# Patient Record
Sex: FEMALE | Race: OTHER | HISPANIC OR LATINO | Employment: UNEMPLOYED | URBAN - METROPOLITAN AREA
[De-identification: names, ages, dates, MRNs, and addresses within clinical notes are randomized per-mention and may not be internally consistent; named-entity substitution may affect disease eponyms.]

---

## 2023-02-11 ENCOUNTER — HOSPITAL ENCOUNTER (INPATIENT)
Facility: HOSPITAL | Age: 23
LOS: 3 days | Discharge: HOME | DRG: 340 | End: 2023-02-16
Attending: EMERGENCY MEDICINE | Admitting: SURGERY

## 2023-02-11 DIAGNOSIS — K35.80 ACUTE APPENDICITIS: Primary | ICD-10-CM

## 2023-02-11 DIAGNOSIS — R10.9 ABDOMINAL PAIN, UNSPECIFIED ABDOMINAL LOCATION: ICD-10-CM

## 2023-02-11 DIAGNOSIS — R00.0 TACHYCARDIA: ICD-10-CM

## 2023-02-11 DIAGNOSIS — R11.2 NAUSEA AND VOMITING, UNSPECIFIED VOMITING TYPE: ICD-10-CM

## 2023-02-11 DIAGNOSIS — K35.80 ACUTE APPENDICITIS, UNSPECIFIED ACUTE APPENDICITIS TYPE: ICD-10-CM

## 2023-02-11 LAB
ALBUMIN SERPL BCP-MCNC: 4.6 G/DL (ref 3.5–5.2)
ALP SERPL-CCNC: 102 U/L (ref 55–135)
ALT SERPL W/O P-5'-P-CCNC: 10 U/L (ref 10–44)
AMORPH CRY URNS QL MICRO: ABNORMAL
ANION GAP SERPL CALC-SCNC: 12 MMOL/L (ref 8–16)
AST SERPL-CCNC: 15 U/L (ref 10–40)
B-HCG UR QL: NEGATIVE
BACTERIA #/AREA URNS HPF: ABNORMAL /HPF
BASOPHILS # BLD AUTO: 0.04 K/UL (ref 0–0.2)
BASOPHILS NFR BLD: 0.1 % (ref 0–1.9)
BILIRUB SERPL-MCNC: 1.2 MG/DL (ref 0.1–1)
BILIRUB UR QL STRIP: ABNORMAL
BUN SERPL-MCNC: 14 MG/DL (ref 6–20)
CALCIUM SERPL-MCNC: 9.6 MG/DL (ref 8.7–10.5)
CHLORIDE SERPL-SCNC: 103 MMOL/L (ref 95–110)
CLARITY UR: ABNORMAL
CO2 SERPL-SCNC: 23 MMOL/L (ref 23–29)
COLOR UR: ABNORMAL
CREAT SERPL-MCNC: 0.8 MG/DL (ref 0.5–1.4)
CTP QC/QA: YES
CTP QC/QA: YES
DIFFERENTIAL METHOD: ABNORMAL
EOSINOPHIL # BLD AUTO: 0 K/UL (ref 0–0.5)
EOSINOPHIL NFR BLD: 0 % (ref 0–8)
ERYTHROCYTE [DISTWIDTH] IN BLOOD BY AUTOMATED COUNT: 12.9 % (ref 11.5–14.5)
EST. GFR  (NO RACE VARIABLE): >60 ML/MIN/1.73 M^2
GLUCOSE SERPL-MCNC: 128 MG/DL (ref 70–110)
GLUCOSE UR QL STRIP: ABNORMAL
HCT VFR BLD AUTO: 36.8 % (ref 37–48.5)
HGB BLD-MCNC: 12.3 G/DL (ref 12–16)
HGB UR QL STRIP: ABNORMAL
HYALINE CASTS #/AREA URNS LPF: 0 /LPF
IMM GRANULOCYTES # BLD AUTO: 0.14 K/UL (ref 0–0.04)
IMM GRANULOCYTES NFR BLD AUTO: 0.5 % (ref 0–0.5)
KETONES UR QL STRIP: ABNORMAL
LACTATE SERPL-SCNC: 1.1 MMOL/L (ref 0.5–2.2)
LEUKOCYTE ESTERASE UR QL STRIP: ABNORMAL
LIPASE SERPL-CCNC: 22 U/L (ref 4–60)
LYMPHOCYTES # BLD AUTO: 0.7 K/UL (ref 1–4.8)
LYMPHOCYTES NFR BLD: 2.4 % (ref 18–48)
MCH RBC QN AUTO: 28.1 PG (ref 27–31)
MCHC RBC AUTO-ENTMCNC: 33.4 G/DL (ref 32–36)
MCV RBC AUTO: 84 FL (ref 82–98)
MICROSCOPIC COMMENT: ABNORMAL
MONOCYTES # BLD AUTO: 1.6 K/UL (ref 0.3–1)
MONOCYTES NFR BLD: 5.8 % (ref 4–15)
NEUTROPHILS # BLD AUTO: 24.6 K/UL (ref 1.8–7.7)
NEUTROPHILS NFR BLD: 91.2 % (ref 38–73)
NITRITE UR QL STRIP: ABNORMAL
NRBC BLD-RTO: 0 /100 WBC
PH UR STRIP: 6 [PH] (ref 5–8)
PLATELET # BLD AUTO: 371 K/UL (ref 150–450)
PMV BLD AUTO: 9.4 FL (ref 9.2–12.9)
POTASSIUM SERPL-SCNC: 3.9 MMOL/L (ref 3.5–5.1)
PROCALCITONIN SERPL IA-MCNC: 0.15 NG/ML
PROT SERPL-MCNC: 9.3 G/DL (ref 6–8.4)
PROT UR QL STRIP: ABNORMAL
RBC # BLD AUTO: 4.37 M/UL (ref 4–5.4)
RBC #/AREA URNS HPF: 0 /HPF (ref 0–4)
SARS-COV-2 RDRP RESP QL NAA+PROBE: NEGATIVE
SODIUM SERPL-SCNC: 138 MMOL/L (ref 136–145)
SP GR UR STRIP: >1.03 (ref 1–1.03)
UNIDENT CRYS URNS QL MICRO: ABNORMAL
URN SPEC COLLECT METH UR: ABNORMAL
UROBILINOGEN UR STRIP-ACNC: ABNORMAL EU/DL
WBC # BLD AUTO: 26.95 K/UL (ref 3.9–12.7)
WBC #/AREA URNS HPF: 0 /HPF (ref 0–5)
WBC CLUMPS URNS QL MICRO: ABNORMAL
YEAST URNS QL MICRO: ABNORMAL

## 2023-02-11 PROCEDURE — 63600175 PHARM REV CODE 636 W HCPCS: Performed by: NURSE PRACTITIONER

## 2023-02-11 PROCEDURE — 96375 TX/PRO/DX INJ NEW DRUG ADDON: CPT

## 2023-02-11 PROCEDURE — 85025 COMPLETE CBC W/AUTO DIFF WBC: CPT | Performed by: NURSE PRACTITIONER

## 2023-02-11 PROCEDURE — 87040 BLOOD CULTURE FOR BACTERIA: CPT | Mod: 59 | Performed by: NURSE PRACTITIONER

## 2023-02-11 PROCEDURE — 96361 HYDRATE IV INFUSION ADD-ON: CPT

## 2023-02-11 PROCEDURE — 25000003 PHARM REV CODE 250: Performed by: STUDENT IN AN ORGANIZED HEALTH CARE EDUCATION/TRAINING PROGRAM

## 2023-02-11 PROCEDURE — 87186 SC STD MICRODIL/AGAR DIL: CPT | Performed by: NURSE PRACTITIONER

## 2023-02-11 PROCEDURE — G0378 HOSPITAL OBSERVATION PER HR: HCPCS

## 2023-02-11 PROCEDURE — 63600175 PHARM REV CODE 636 W HCPCS: Performed by: STUDENT IN AN ORGANIZED HEALTH CARE EDUCATION/TRAINING PROGRAM

## 2023-02-11 PROCEDURE — 84145 PROCALCITONIN (PCT): CPT | Performed by: NURSE PRACTITIONER

## 2023-02-11 PROCEDURE — 87088 URINE BACTERIA CULTURE: CPT | Performed by: NURSE PRACTITIONER

## 2023-02-11 PROCEDURE — 25500020 PHARM REV CODE 255: Performed by: NURSE PRACTITIONER

## 2023-02-11 PROCEDURE — 99285 EMERGENCY DEPT VISIT HI MDM: CPT | Mod: 25

## 2023-02-11 PROCEDURE — 96372 THER/PROPH/DIAG INJ SC/IM: CPT | Mod: 59 | Performed by: NURSE PRACTITIONER

## 2023-02-11 PROCEDURE — 81025 URINE PREGNANCY TEST: CPT | Performed by: EMERGENCY MEDICINE

## 2023-02-11 PROCEDURE — 83690 ASSAY OF LIPASE: CPT | Performed by: NURSE PRACTITIONER

## 2023-02-11 PROCEDURE — 83605 ASSAY OF LACTIC ACID: CPT | Performed by: NURSE PRACTITIONER

## 2023-02-11 PROCEDURE — 80053 COMPREHEN METABOLIC PANEL: CPT | Performed by: NURSE PRACTITIONER

## 2023-02-11 PROCEDURE — 96365 THER/PROPH/DIAG IV INF INIT: CPT

## 2023-02-11 PROCEDURE — 25000003 PHARM REV CODE 250: Performed by: NURSE PRACTITIONER

## 2023-02-11 PROCEDURE — 87077 CULTURE AEROBIC IDENTIFY: CPT | Performed by: NURSE PRACTITIONER

## 2023-02-11 PROCEDURE — 81000 URINALYSIS NONAUTO W/SCOPE: CPT | Performed by: NURSE PRACTITIONER

## 2023-02-11 PROCEDURE — 87086 URINE CULTURE/COLONY COUNT: CPT | Performed by: NURSE PRACTITIONER

## 2023-02-11 RX ORDER — ONDANSETRON 2 MG/ML
8 INJECTION INTRAMUSCULAR; INTRAVENOUS
Status: COMPLETED | OUTPATIENT
Start: 2023-02-11 | End: 2023-02-11

## 2023-02-11 RX ORDER — KETOROLAC TROMETHAMINE 30 MG/ML
15 INJECTION, SOLUTION INTRAMUSCULAR; INTRAVENOUS
Status: COMPLETED | OUTPATIENT
Start: 2023-02-11 | End: 2023-02-11

## 2023-02-11 RX ORDER — SODIUM CHLORIDE 0.9 % (FLUSH) 0.9 %
10 SYRINGE (ML) INJECTION
Status: DISCONTINUED | OUTPATIENT
Start: 2023-02-11 | End: 2023-02-16 | Stop reason: HOSPADM

## 2023-02-11 RX ORDER — TALC
6 POWDER (GRAM) TOPICAL NIGHTLY PRN
Status: DISCONTINUED | OUTPATIENT
Start: 2023-02-11 | End: 2023-02-16 | Stop reason: HOSPADM

## 2023-02-11 RX ORDER — ACETAMINOPHEN 325 MG/1
650 TABLET ORAL EVERY 8 HOURS PRN
Status: DISCONTINUED | OUTPATIENT
Start: 2023-02-11 | End: 2023-02-16 | Stop reason: HOSPADM

## 2023-02-11 RX ORDER — DICYCLOMINE HYDROCHLORIDE 10 MG/ML
20 INJECTION INTRAMUSCULAR
Status: COMPLETED | OUTPATIENT
Start: 2023-02-11 | End: 2023-02-11

## 2023-02-11 RX ORDER — ONDANSETRON 8 MG/1
8 TABLET, ORALLY DISINTEGRATING ORAL EVERY 8 HOURS PRN
Status: DISCONTINUED | OUTPATIENT
Start: 2023-02-11 | End: 2023-02-16 | Stop reason: HOSPADM

## 2023-02-11 RX ORDER — SODIUM CHLORIDE, SODIUM LACTATE, POTASSIUM CHLORIDE, CALCIUM CHLORIDE 600; 310; 30; 20 MG/100ML; MG/100ML; MG/100ML; MG/100ML
INJECTION, SOLUTION INTRAVENOUS CONTINUOUS
Status: DISCONTINUED | OUTPATIENT
Start: 2023-02-11 | End: 2023-02-14

## 2023-02-11 RX ORDER — MORPHINE SULFATE 4 MG/ML
2 INJECTION, SOLUTION INTRAMUSCULAR; INTRAVENOUS EVERY 4 HOURS PRN
Status: DISCONTINUED | OUTPATIENT
Start: 2023-02-11 | End: 2023-02-13

## 2023-02-11 RX ORDER — LIDOCAINE HYDROCHLORIDE 10 MG/ML
1 INJECTION, SOLUTION EPIDURAL; INFILTRATION; INTRACAUDAL; PERINEURAL ONCE AS NEEDED
Status: DISCONTINUED | OUTPATIENT
Start: 2023-02-11 | End: 2023-02-16 | Stop reason: HOSPADM

## 2023-02-11 RX ORDER — MORPHINE SULFATE 4 MG/ML
4 INJECTION, SOLUTION INTRAMUSCULAR; INTRAVENOUS EVERY 4 HOURS PRN
Status: DISCONTINUED | OUTPATIENT
Start: 2023-02-11 | End: 2023-02-13

## 2023-02-11 RX ADMIN — DICYCLOMINE HYDROCHLORIDE 20 MG: 20 INJECTION INTRAMUSCULAR at 09:02

## 2023-02-11 RX ADMIN — SODIUM CHLORIDE 1000 ML: 9 INJECTION, SOLUTION INTRAVENOUS at 10:02

## 2023-02-11 RX ADMIN — SODIUM CHLORIDE 1000 ML: 9 INJECTION, SOLUTION INTRAVENOUS at 09:02

## 2023-02-11 RX ADMIN — KETOROLAC TROMETHAMINE 15 MG: 30 INJECTION, SOLUTION INTRAMUSCULAR; INTRAVENOUS at 11:02

## 2023-02-11 RX ADMIN — PIPERACILLIN AND TAZOBACTAM 4.5 G: 4; .5 INJECTION, POWDER, LYOPHILIZED, FOR SOLUTION INTRAVENOUS; PARENTERAL at 10:02

## 2023-02-11 RX ADMIN — IOHEXOL 70 ML: 350 INJECTION, SOLUTION INTRAVENOUS at 09:02

## 2023-02-11 RX ADMIN — ONDANSETRON 8 MG: 2 INJECTION INTRAMUSCULAR; INTRAVENOUS at 09:02

## 2023-02-12 ENCOUNTER — ANESTHESIA EVENT (OUTPATIENT)
Dept: SURGERY | Facility: HOSPITAL | Age: 23
DRG: 340 | End: 2023-02-12

## 2023-02-12 ENCOUNTER — ANESTHESIA (OUTPATIENT)
Dept: SURGERY | Facility: HOSPITAL | Age: 23
DRG: 340 | End: 2023-02-12

## 2023-02-12 LAB
ANION GAP SERPL CALC-SCNC: 8 MMOL/L (ref 8–16)
BASOPHILS # BLD AUTO: 0.03 K/UL (ref 0–0.2)
BASOPHILS NFR BLD: 0.2 % (ref 0–1.9)
BUN SERPL-MCNC: 11 MG/DL (ref 6–20)
CALCIUM SERPL-MCNC: 8 MG/DL (ref 8.7–10.5)
CHLORIDE SERPL-SCNC: 109 MMOL/L (ref 95–110)
CO2 SERPL-SCNC: 22 MMOL/L (ref 23–29)
CREAT SERPL-MCNC: 0.7 MG/DL (ref 0.5–1.4)
DIFFERENTIAL METHOD: ABNORMAL
EOSINOPHIL # BLD AUTO: 0 K/UL (ref 0–0.5)
EOSINOPHIL NFR BLD: 0 % (ref 0–8)
ERYTHROCYTE [DISTWIDTH] IN BLOOD BY AUTOMATED COUNT: 13.1 % (ref 11.5–14.5)
EST. GFR  (NO RACE VARIABLE): >60 ML/MIN/1.73 M^2
GLUCOSE SERPL-MCNC: 113 MG/DL (ref 70–110)
HCT VFR BLD AUTO: 31.2 % (ref 37–48.5)
HGB BLD-MCNC: 10.3 G/DL (ref 12–16)
IMM GRANULOCYTES # BLD AUTO: 0.12 K/UL (ref 0–0.04)
IMM GRANULOCYTES NFR BLD AUTO: 0.6 % (ref 0–0.5)
LACTATE SERPL-SCNC: 1 MMOL/L (ref 0.5–2.2)
LYMPHOCYTES # BLD AUTO: 1 K/UL (ref 1–4.8)
LYMPHOCYTES NFR BLD: 5.4 % (ref 18–48)
MAGNESIUM SERPL-MCNC: 1.9 MG/DL (ref 1.6–2.6)
MCH RBC QN AUTO: 28.5 PG (ref 27–31)
MCHC RBC AUTO-ENTMCNC: 33 G/DL (ref 32–36)
MCV RBC AUTO: 86 FL (ref 82–98)
MONOCYTES # BLD AUTO: 1.1 K/UL (ref 0.3–1)
MONOCYTES NFR BLD: 5.8 % (ref 4–15)
NEUTROPHILS # BLD AUTO: 17.1 K/UL (ref 1.8–7.7)
NEUTROPHILS NFR BLD: 88 % (ref 38–73)
NRBC BLD-RTO: 0 /100 WBC
PHOSPHATE SERPL-MCNC: 2.9 MG/DL (ref 2.7–4.5)
PLATELET # BLD AUTO: 295 K/UL (ref 150–450)
PMV BLD AUTO: 9.3 FL (ref 9.2–12.9)
POTASSIUM SERPL-SCNC: 3.7 MMOL/L (ref 3.5–5.1)
RBC # BLD AUTO: 3.62 M/UL (ref 4–5.4)
SODIUM SERPL-SCNC: 139 MMOL/L (ref 136–145)
WBC # BLD AUTO: 19.38 K/UL (ref 3.9–12.7)

## 2023-02-12 PROCEDURE — 84100 ASSAY OF PHOSPHORUS: CPT | Performed by: STUDENT IN AN ORGANIZED HEALTH CARE EDUCATION/TRAINING PROGRAM

## 2023-02-12 PROCEDURE — 25000003 PHARM REV CODE 250: Performed by: STUDENT IN AN ORGANIZED HEALTH CARE EDUCATION/TRAINING PROGRAM

## 2023-02-12 PROCEDURE — 37000008 HC ANESTHESIA 1ST 15 MINUTES: Performed by: SURGERY

## 2023-02-12 PROCEDURE — 96361 HYDRATE IV INFUSION ADD-ON: CPT

## 2023-02-12 PROCEDURE — D9220A PRA ANESTHESIA: ICD-10-PCS | Mod: ,,, | Performed by: ANESTHESIOLOGY

## 2023-02-12 PROCEDURE — 83735 ASSAY OF MAGNESIUM: CPT | Performed by: STUDENT IN AN ORGANIZED HEALTH CARE EDUCATION/TRAINING PROGRAM

## 2023-02-12 PROCEDURE — 25000003 PHARM REV CODE 250: Performed by: NURSE ANESTHETIST, CERTIFIED REGISTERED

## 2023-02-12 PROCEDURE — 99222 PR INITIAL HOSPITAL CARE,LEVL II: ICD-10-PCS | Mod: ,,, | Performed by: SURGERY

## 2023-02-12 PROCEDURE — 27201423 OPTIME MED/SURG SUP & DEVICES STERILE SUPPLY: Performed by: SURGERY

## 2023-02-12 PROCEDURE — 88304 PR  SURG PATH,LEVEL III: ICD-10-PCS | Mod: 26,,, | Performed by: PATHOLOGY

## 2023-02-12 PROCEDURE — 63600175 PHARM REV CODE 636 W HCPCS: Performed by: STUDENT IN AN ORGANIZED HEALTH CARE EDUCATION/TRAINING PROGRAM

## 2023-02-12 PROCEDURE — 88304 TISSUE EXAM BY PATHOLOGIST: CPT | Performed by: PATHOLOGY

## 2023-02-12 PROCEDURE — 44970 PR LAP,APPENDECTOMY: ICD-10-PCS | Mod: ,,, | Performed by: SURGERY

## 2023-02-12 PROCEDURE — 94760 N-INVAS EAR/PLS OXIMETRY 1: CPT

## 2023-02-12 PROCEDURE — 83605 ASSAY OF LACTIC ACID: CPT | Performed by: STUDENT IN AN ORGANIZED HEALTH CARE EDUCATION/TRAINING PROGRAM

## 2023-02-12 PROCEDURE — G0378 HOSPITAL OBSERVATION PER HR: HCPCS

## 2023-02-12 PROCEDURE — 96366 THER/PROPH/DIAG IV INF ADDON: CPT

## 2023-02-12 PROCEDURE — 85025 COMPLETE CBC W/AUTO DIFF WBC: CPT | Performed by: STUDENT IN AN ORGANIZED HEALTH CARE EDUCATION/TRAINING PROGRAM

## 2023-02-12 PROCEDURE — 96375 TX/PRO/DX INJ NEW DRUG ADDON: CPT

## 2023-02-12 PROCEDURE — 36000709 HC OR TIME LEV III EA ADD 15 MIN: Performed by: SURGERY

## 2023-02-12 PROCEDURE — 99222 1ST HOSP IP/OBS MODERATE 55: CPT | Mod: ,,, | Performed by: SURGERY

## 2023-02-12 PROCEDURE — 63600175 PHARM REV CODE 636 W HCPCS: Performed by: NURSE ANESTHETIST, CERTIFIED REGISTERED

## 2023-02-12 PROCEDURE — 80048 BASIC METABOLIC PNL TOTAL CA: CPT | Performed by: STUDENT IN AN ORGANIZED HEALTH CARE EDUCATION/TRAINING PROGRAM

## 2023-02-12 PROCEDURE — 36415 COLL VENOUS BLD VENIPUNCTURE: CPT | Performed by: STUDENT IN AN ORGANIZED HEALTH CARE EDUCATION/TRAINING PROGRAM

## 2023-02-12 PROCEDURE — 36000708 HC OR TIME LEV III 1ST 15 MIN: Performed by: SURGERY

## 2023-02-12 PROCEDURE — 37000009 HC ANESTHESIA EA ADD 15 MINS: Performed by: SURGERY

## 2023-02-12 PROCEDURE — 96376 TX/PRO/DX INJ SAME DRUG ADON: CPT

## 2023-02-12 PROCEDURE — 71000033 HC RECOVERY, INTIAL HOUR: Performed by: SURGERY

## 2023-02-12 PROCEDURE — 25000003 PHARM REV CODE 250: Performed by: SURGERY

## 2023-02-12 PROCEDURE — 63600175 PHARM REV CODE 636 W HCPCS: Performed by: ANESTHESIOLOGY

## 2023-02-12 PROCEDURE — D9220A PRA ANESTHESIA: Mod: ,,, | Performed by: ANESTHESIOLOGY

## 2023-02-12 PROCEDURE — 88304 TISSUE EXAM BY PATHOLOGIST: CPT | Mod: 26,,, | Performed by: PATHOLOGY

## 2023-02-12 PROCEDURE — 44970 LAPAROSCOPY APPENDECTOMY: CPT | Mod: ,,, | Performed by: SURGERY

## 2023-02-12 RX ORDER — ONDANSETRON 2 MG/ML
INJECTION INTRAMUSCULAR; INTRAVENOUS
Status: DISCONTINUED | OUTPATIENT
Start: 2023-02-12 | End: 2023-02-12

## 2023-02-12 RX ORDER — PROPOFOL 10 MG/ML
VIAL (ML) INTRAVENOUS
Status: DISCONTINUED | OUTPATIENT
Start: 2023-02-12 | End: 2023-02-12

## 2023-02-12 RX ORDER — ROCURONIUM BROMIDE 10 MG/ML
INJECTION, SOLUTION INTRAVENOUS
Status: DISCONTINUED | OUTPATIENT
Start: 2023-02-12 | End: 2023-02-12

## 2023-02-12 RX ORDER — HYDROMORPHONE HYDROCHLORIDE 2 MG/ML
0.2 INJECTION, SOLUTION INTRAMUSCULAR; INTRAVENOUS; SUBCUTANEOUS EVERY 5 MIN PRN
Status: DISCONTINUED | OUTPATIENT
Start: 2023-02-12 | End: 2023-02-12 | Stop reason: HOSPADM

## 2023-02-12 RX ORDER — PHENYLEPHRINE HYDROCHLORIDE 10 MG/ML
INJECTION INTRAVENOUS
Status: DISCONTINUED | OUTPATIENT
Start: 2023-02-12 | End: 2023-02-12

## 2023-02-12 RX ORDER — BUPIVACAINE HYDROCHLORIDE 2.5 MG/ML
INJECTION, SOLUTION INFILTRATION; PERINEURAL
Status: DISCONTINUED | OUTPATIENT
Start: 2023-02-12 | End: 2023-02-12 | Stop reason: HOSPADM

## 2023-02-12 RX ORDER — DEXAMETHASONE SODIUM PHOSPHATE 4 MG/ML
INJECTION, SOLUTION INTRA-ARTICULAR; INTRALESIONAL; INTRAMUSCULAR; INTRAVENOUS; SOFT TISSUE
Status: DISCONTINUED | OUTPATIENT
Start: 2023-02-12 | End: 2023-02-12

## 2023-02-12 RX ORDER — FENTANYL CITRATE 50 UG/ML
INJECTION, SOLUTION INTRAMUSCULAR; INTRAVENOUS
Status: DISCONTINUED | OUTPATIENT
Start: 2023-02-12 | End: 2023-02-12

## 2023-02-12 RX ORDER — ONDANSETRON 2 MG/ML
4 INJECTION INTRAMUSCULAR; INTRAVENOUS DAILY PRN
Status: DISCONTINUED | OUTPATIENT
Start: 2023-02-12 | End: 2023-02-12 | Stop reason: HOSPADM

## 2023-02-12 RX ORDER — SODIUM CHLORIDE 0.9 % (FLUSH) 0.9 %
10 SYRINGE (ML) INJECTION
Status: DISCONTINUED | OUTPATIENT
Start: 2023-02-12 | End: 2023-02-12 | Stop reason: HOSPADM

## 2023-02-12 RX ORDER — PROCHLORPERAZINE EDISYLATE 5 MG/ML
INJECTION INTRAMUSCULAR; INTRAVENOUS
Status: DISCONTINUED | OUTPATIENT
Start: 2023-02-12 | End: 2023-02-12

## 2023-02-12 RX ORDER — LIDOCAINE HYDROCHLORIDE 20 MG/ML
INJECTION INTRAVENOUS
Status: DISCONTINUED | OUTPATIENT
Start: 2023-02-12 | End: 2023-02-12

## 2023-02-12 RX ORDER — MIDAZOLAM HYDROCHLORIDE 1 MG/ML
INJECTION, SOLUTION INTRAMUSCULAR; INTRAVENOUS
Status: DISCONTINUED | OUTPATIENT
Start: 2023-02-12 | End: 2023-02-12

## 2023-02-12 RX ADMIN — ONDANSETRON 4 MG: 2 INJECTION, SOLUTION INTRAMUSCULAR; INTRAVENOUS at 10:02

## 2023-02-12 RX ADMIN — PIPERACILLIN AND TAZOBACTAM 4.5 G: 4; .5 INJECTION, POWDER, LYOPHILIZED, FOR SOLUTION INTRAVENOUS; PARENTERAL at 06:02

## 2023-02-12 RX ADMIN — PIPERACILLIN AND TAZOBACTAM 4.5 G: 4; .5 INJECTION, POWDER, LYOPHILIZED, FOR SOLUTION INTRAVENOUS; PARENTERAL at 04:02

## 2023-02-12 RX ADMIN — DEXAMETHASONE SODIUM PHOSPHATE 4 MG: 4 INJECTION, SOLUTION INTRAMUSCULAR; INTRAVENOUS at 10:02

## 2023-02-12 RX ADMIN — SODIUM CHLORIDE, SODIUM LACTATE, POTASSIUM CHLORIDE, AND CALCIUM CHLORIDE: .6; .31; .03; .02 INJECTION, SOLUTION INTRAVENOUS at 10:02

## 2023-02-12 RX ADMIN — SODIUM CHLORIDE, POTASSIUM CHLORIDE, SODIUM LACTATE AND CALCIUM CHLORIDE 500 ML: 600; 310; 30; 20 INJECTION, SOLUTION INTRAVENOUS at 07:02

## 2023-02-12 RX ADMIN — SUGAMMADEX 200 MG: 100 INJECTION, SOLUTION INTRAVENOUS at 11:02

## 2023-02-12 RX ADMIN — SODIUM CHLORIDE, POTASSIUM CHLORIDE, SODIUM LACTATE AND CALCIUM CHLORIDE: 600; 310; 30; 20 INJECTION, SOLUTION INTRAVENOUS at 08:02

## 2023-02-12 RX ADMIN — SODIUM CHLORIDE, POTASSIUM CHLORIDE, SODIUM LACTATE AND CALCIUM CHLORIDE: 600; 310; 30; 20 INJECTION, SOLUTION INTRAVENOUS at 12:02

## 2023-02-12 RX ADMIN — MORPHINE SULFATE 4 MG: 4 INJECTION, SOLUTION INTRAMUSCULAR; INTRAVENOUS at 05:02

## 2023-02-12 RX ADMIN — FENTANYL CITRATE 100 MCG: 0.05 INJECTION, SOLUTION INTRAMUSCULAR; INTRAVENOUS at 10:02

## 2023-02-12 RX ADMIN — LIDOCAINE HYDROCHLORIDE 60 MG: 20 INJECTION, SOLUTION INTRAVENOUS at 10:02

## 2023-02-12 RX ADMIN — PHENYLEPHRINE HYDROCHLORIDE 100 MCG: 10 INJECTION INTRAVENOUS at 10:02

## 2023-02-12 RX ADMIN — PIPERACILLIN AND TAZOBACTAM 4.5 G: 4; .5 INJECTION, POWDER, LYOPHILIZED, FOR SOLUTION INTRAVENOUS; PARENTERAL at 10:02

## 2023-02-12 RX ADMIN — PROPOFOL 140 MG: 10 INJECTION, EMULSION INTRAVENOUS at 10:02

## 2023-02-12 RX ADMIN — PHENYLEPHRINE HYDROCHLORIDE 200 MCG: 10 INJECTION INTRAVENOUS at 11:02

## 2023-02-12 RX ADMIN — MIDAZOLAM HYDROCHLORIDE 2 MG: 1 INJECTION, SOLUTION INTRAMUSCULAR; INTRAVENOUS at 10:02

## 2023-02-12 RX ADMIN — SODIUM CHLORIDE, POTASSIUM CHLORIDE, SODIUM LACTATE AND CALCIUM CHLORIDE: 600; 310; 30; 20 INJECTION, SOLUTION INTRAVENOUS at 04:02

## 2023-02-12 RX ADMIN — PROCHLORPERAZINE EDISYLATE 5 MG: 5 INJECTION INTRAMUSCULAR; INTRAVENOUS at 10:02

## 2023-02-12 RX ADMIN — SODIUM CHLORIDE, SODIUM LACTATE, POTASSIUM CHLORIDE, AND CALCIUM CHLORIDE: .6; .31; .03; .02 INJECTION, SOLUTION INTRAVENOUS at 11:02

## 2023-02-12 RX ADMIN — HYDROMORPHONE HYDROCHLORIDE 0.2 MG: 2 INJECTION, SOLUTION INTRAMUSCULAR; INTRAVENOUS; SUBCUTANEOUS at 12:02

## 2023-02-12 RX ADMIN — MORPHINE SULFATE 4 MG: 4 INJECTION, SOLUTION INTRAMUSCULAR; INTRAVENOUS at 07:02

## 2023-02-12 RX ADMIN — ROCURONIUM BROMIDE 50 MG: 10 INJECTION, SOLUTION INTRAVENOUS at 10:02

## 2023-02-12 NOTE — ANESTHESIA POSTPROCEDURE EVALUATION
Anesthesia Post Evaluation    Patient: Ese Martinez    Procedure(s) Performed: Procedure(s) (LRB):  APPENDECTOMY, LAPAROSCOPIC (N/A)    Final Anesthesia Type: general      Patient location during evaluation: PACU  Patient participation: Yes- Able to Participate  Level of consciousness: awake and alert  Post-procedure vital signs: reviewed and stable  Pain management: adequate  Airway patency: patent    PONV status at discharge: No PONV  Anesthetic complications: no      Cardiovascular status: hemodynamically stable  Respiratory status: unassisted and spontaneous ventilation  Hydration status: euvolemic  Follow-up not needed.          Vitals Value Taken Time   /62 02/12/23 1231   Temp 37.7 °C (99.9 °F) 02/12/23 1204   Pulse 112 02/12/23 1243   Resp 13 02/12/23 1243   SpO2 94 % 02/12/23 1243   Vitals shown include unvalidated device data.      No case tracking events are documented in the log.      Pain/Lukas Score: Pain Rating Prior to Med Admin: 7 (2/12/2023 12:30 PM)  Pain Rating Post Med Admin: 5 (2/12/2023 12:35 PM)  Lukas Score: 10 (2/12/2023 12:30 PM)

## 2023-02-12 NOTE — PLAN OF CARE
02/12/23 1435      Offer of free  was accepted or rejected? accepted   Interpreted items include  visit    service used Over the phone   's ID # [id number] Laurie

## 2023-02-12 NOTE — SUBJECTIVE & OBJECTIVE
No current facility-administered medications on file prior to encounter.     No current outpatient medications on file prior to encounter.       Review of patient's allergies indicates:  No Known Allergies    No past medical history on file.  No past surgical history on file.  Family History    None       Tobacco Use    Smoking status: Not on file    Smokeless tobacco: Not on file   Substance and Sexual Activity    Alcohol use: Not on file    Drug use: Not on file    Sexual activity: Not on file     Review of Systems   Constitutional:  Positive for activity change and appetite change. Negative for chills, fatigue, fever and unexpected weight change.   HENT:  Negative for congestion, sinus pressure, sinus pain and sore throat.    Eyes:  Negative for visual disturbance.   Respiratory:  Negative for cough, chest tightness and shortness of breath.    Cardiovascular:  Negative for chest pain and palpitations.   Gastrointestinal:  Positive for abdominal pain, nausea and vomiting. Negative for constipation and diarrhea.   Genitourinary:  Negative for difficulty urinating, flank pain and urgency.   Musculoskeletal:  Negative for arthralgias, back pain and neck pain.   Neurological:  Negative for dizziness, weakness, light-headedness and headaches.   Objective:     Vital Signs (Most Recent):  Temp: 99.8 °F (37.7 °C) (02/12/23 0701)  Pulse: (!) 126 (02/12/23 0701)  Resp: 18 (02/12/23 0706)  BP: (!) 108/58 (02/12/23 0701)  SpO2: 97 % (02/12/23 0701)   Vital Signs (24h Range):  Temp:  [97.7 °F (36.5 °C)-99.8 °F (37.7 °C)] 99.8 °F (37.7 °C)  Pulse:  [112-130] 126  Resp:  [17-20] 18  SpO2:  [97 %-100 %] 97 %  BP: (105-129)/(58-78) 108/58     Weight: 61 kg (134 lb 7.7 oz)  Body mass index is 25.41 kg/m².    Physical Exam  Constitutional:       General: She is not in acute distress.  HENT:      Head: Normocephalic and atraumatic.   Eyes:      Extraocular Movements: Extraocular movements intact.      Conjunctiva/sclera: Conjunctivae  normal.      Pupils: Pupils are equal, round, and reactive to light.   Cardiovascular:      Rate and Rhythm: Regular rhythm. Tachycardia present.   Pulmonary:      Effort: Pulmonary effort is normal. No respiratory distress.   Abdominal:      General: There is no distension.      Palpations: Abdomen is soft.      Comments: Lower abdomen tender to palpation  No rebound. Voluntary guarding.    Neurological:      General: No focal deficit present.      Mental Status: She is alert.       Significant Labs:  I have reviewed all pertinent lab results within the past 24 hours.  CBC:   Recent Labs   Lab 02/12/23 0222   WBC 19.38*   RBC 3.62*   HGB 10.3*   HCT 31.2*      MCV 86   MCH 28.5   MCHC 33.0     CMP:   Recent Labs   Lab 02/11/23 2056 02/12/23 0222   * 113*   CALCIUM 9.6 8.0*   ALBUMIN 4.6  --    PROT 9.3*  --     139   K 3.9 3.7   CO2 23 22*    109   BUN 14 11   CREATININE 0.8 0.7   ALKPHOS 102  --    ALT 10  --    AST 15  --    BILITOT 1.2*  --        Significant Diagnostics:  I have reviewed all pertinent imaging results/findings within the past 24 hours.

## 2023-02-12 NOTE — NURSING
Report received and care assumed. Discussed plan of care and safety with patient . Reviewed call system. No acute distress noted  Morphine 4 mg given for pain 10/10

## 2023-02-12 NOTE — H&P
[facility]  General Surgery  History & Physical    Patient Name: Ese Martinez  MRN: [id number]  Admission Date: 2/11/2023  Attending Physician: Flako Ruelas MD   Primary Care Provider: Primary Doctor No    Patient information was obtained from patient and ER records.     Subjective:     Chief Complaint/Reason for Admission: abdominal pain    History of Present Illness: Patient is a 22 y.o. female with no significant past medical history who was admitted for acute appendicitis. Patient states that she began having LUQ pain and emesis at 3 am yesterday morning. She reports being in usual state of health prior to that. States her pain progressed and she was unable to keep anything down so she went to the ED. Pain relocated to her lower abdomen. Tachycardic on admission. WBC on labs 27 and CT with findings consistent with non-perforated acute appendicitis.       No current facility-administered medications on file prior to encounter.     No current outpatient medications on file prior to encounter.       Review of patient's allergies indicates:  No Known Allergies    No past medical history on file.  No past surgical history on file.  Family History    None       Tobacco Use    Smoking status: Not on file    Smokeless tobacco: Not on file   Substance and Sexual Activity    Alcohol use: Not on file    Drug use: Not on file    Sexual activity: Not on file     Review of Systems   Constitutional:  Positive for activity change and appetite change. Negative for chills, fatigue, fever and unexpected weight change.   HENT:  Negative for congestion, sinus pressure, sinus pain and sore throat.    Eyes:  Negative for visual disturbance.   Respiratory:  Negative for cough, chest tightness and shortness of breath.    Cardiovascular:  Negative for chest pain and palpitations.   Gastrointestinal:  Positive for abdominal pain, nausea and vomiting. Negative for constipation and diarrhea.   Genitourinary:   Negative for difficulty urinating, flank pain and urgency.   Musculoskeletal:  Negative for arthralgias, back pain and neck pain.   Neurological:  Negative for dizziness, weakness, light-headedness and headaches.   Objective:     Vital Signs (Most Recent):  Temp: 99.8 °F (37.7 °C) (02/12/23 0701)  Pulse: (!) 126 (02/12/23 0701)  Resp: 18 (02/12/23 0706)  BP: (!) 108/58 (02/12/23 0701)  SpO2: 97 % (02/12/23 0701)   Vital Signs (24h Range):  Temp:  [97.7 °F (36.5 °C)-99.8 °F (37.7 °C)] 99.8 °F (37.7 °C)  Pulse:  [112-130] 126  Resp:  [17-20] 18  SpO2:  [97 %-100 %] 97 %  BP: (105-129)/(58-78) 108/58     Weight: 61 kg (134 lb 7.7 oz)  Body mass index is 25.41 kg/m².    Physical Exam  Constitutional:       General: She is not in acute distress.  HENT:      Head: Normocephalic and atraumatic.   Eyes:      Extraocular Movements: Extraocular movements intact.      Conjunctiva/sclera: Conjunctivae normal.      Pupils: Pupils are equal, round, and reactive to light.   Cardiovascular:      Rate and Rhythm: Regular rhythm. Tachycardia present.   Pulmonary:      Effort: Pulmonary effort is normal. No respiratory distress.   Abdominal:      General: There is no distension.      Palpations: Abdomen is soft.      Comments: Lower abdomen tender to palpation  No rebound. Voluntary guarding.    Neurological:      General: No focal deficit present.      Mental Status: She is alert.       Significant Labs:  I have reviewed all pertinent lab results within the past 24 hours.  CBC:   Recent Labs   Lab 02/12/23 0222   WBC 19.38*   RBC 3.62*   HGB 10.3*   HCT 31.2*      MCV 86   MCH 28.5   MCHC 33.0     CMP:   Recent Labs   Lab 02/11/23 2056 02/12/23 0222   * 113*   CALCIUM 9.6 8.0*   ALBUMIN 4.6  --    PROT 9.3*  --     139   K 3.9 3.7   CO2 23 22*    109   BUN 14 11   CREATININE 0.8 0.7   ALKPHOS 102  --    ALT 10  --    AST 15  --    BILITOT 1.2*  --        Significant Diagnostics:  I have reviewed all  pertinent imaging results/findings within the past 24 hours.      Assessment/Plan:     * Acute appendicitis  22 y.o. female with no significant past medical history who was admitted for acute appendicitis.     - Plan for OR today for lap appy. Written consent obtained using   - NPO for procedure  - IV antibiotics  - Prn pain medication and anti-emetics      VTE Risk Mitigation (From admission, onward)         Ordered     Place sequential compression device  Until discontinued         02/11/23 2248     IP VTE LOW RISK PATIENT  Once         02/11/23 2248                Talia Dumont MD  General Surgery  [facility]

## 2023-02-12 NOTE — NURSING
Pt arrived on unit via wheelchair, awake alert and oriented. Tajik speaking,  used,Sissy #[id number]. 5/10 abdominal pain. No n/v. IV site noted, IVF started. Pt on room air; no distress. Vitals assessed. Surgical bath. Pt is npo. Safety measures maintained. Will cont to monitor

## 2023-02-12 NOTE — ED PROVIDER NOTES
Encounter Date: 2/11/2023    SCRIBE #1 NOTE: I, Monse Jensen, am scribing for, and in the presence of,  Kye Kline NP. I have scribed the following portions of the note - Other sections scribed: HPI, ROS.     History     Chief Complaint   Patient presents with    Nausea    Vomiting     Pt c/o abdominal pain with nv starting at 3:00 am this morning      Ese Martinez is a 22 y.o. female, with no pertinent past medical history, who presents to the ED with emesis that began 3 am today. Patient states she has not been able to keep anything down today. Patient has associated symptoms of lower abdominal pain, headache, chills, dysuria, and nausea. Patient endorsed an unknown medication PTA with no relief. No other exacerbating or alleviating factors. Patient denies diarrhea, fever, congestion, cough, hematuria, or other associated symptoms. No known allergies.       The history is provided by the patient. A  was used ([id number]).   Review of patient's allergies indicates:  No Known Allergies  No past medical history on file.  No past surgical history on file.  No family history on file.     Review of Systems   Constitutional:  Positive for chills. Negative for fever.   HENT:  Negative for congestion, rhinorrhea and sore throat.    Eyes:  Negative for visual disturbance.   Respiratory:  Negative for cough and shortness of breath.    Cardiovascular:  Negative for chest pain.   Gastrointestinal:  Positive for abdominal pain and vomiting. Negative for diarrhea and nausea.   Genitourinary:  Positive for dysuria. Negative for frequency and hematuria.   Skin:  Negative for color change.   Allergic/Immunologic: Negative for food allergies.   Neurological:  Positive for headaches. Negative for syncope.   Hematological:  Does not bruise/bleed easily.     Physical Exam     Initial Vitals [02/11/23 1854]   BP Pulse Resp Temp SpO2   129/78 (!) 130 18 97.7 °F (36.5 °C) 100 %      MAP       --          Physical Exam    Nursing note and vitals reviewed.  Constitutional: She appears well-developed and well-nourished. She is not diaphoretic. No distress.   HENT:   Head: Normocephalic and atraumatic.   Right Ear: External ear normal.   Left Ear: External ear normal.   Nose: Nose normal.   Eyes: Conjunctivae and EOM are normal. Right eye exhibits no discharge. Left eye exhibits no discharge.   Neck: Neck supple. No tracheal deviation present.   Normal range of motion.  Cardiovascular:  Normal rate.           Pulmonary/Chest: No stridor. No respiratory distress.   Abdominal: Abdomen is soft. She exhibits no distension. There is generalized abdominal tenderness.   Generalized abdominal tenderness.  No focal tenderness.  No rigidity or guarding.  Abdomen is soft.   Musculoskeletal:         General: No tenderness. Normal range of motion.      Cervical back: Normal range of motion and neck supple.     Neurological: She is alert and oriented to person, place, and time. She has normal strength. No cranial nerve deficit or sensory deficit.   Skin: Skin is warm and dry.   Psychiatric: She has a normal mood and affect. Her behavior is normal. Judgment and thought content normal.       ED Course   Procedures  Labs Reviewed   URINALYSIS, REFLEX TO URINE CULTURE - Abnormal; Notable for the following components:       Result Value    Color, UA Orange (*)     Appearance, UA Cloudy (*)     Specific Gravity, UA >1.030 (*)     All other components within normal limits    Narrative:     Specimen Source->Urine   CBC W/ AUTO DIFFERENTIAL - Abnormal; Notable for the following components:    WBC 26.95 (*)     Hematocrit 36.8 (*)     Gran # (ANC) 24.6 (*)     Immature Grans (Abs) 0.14 (*)     Lymph # 0.7 (*)     Mono # 1.6 (*)     Gran % 91.2 (*)     Lymph % 2.4 (*)     All other components within normal limits   COMPREHENSIVE METABOLIC PANEL - Abnormal; Notable for the following components:    Glucose 128 (*)     Total Protein 9.3 (*)      Total Bilirubin 1.2 (*)     All other components within normal limits   URINALYSIS MICROSCOPIC - Abnormal; Notable for the following components:    Amorphous, UA Many (*)     All other components within normal limits    Narrative:     Specimen Source->Urine   CULTURE, BLOOD   CULTURE, BLOOD   CULTURE, URINE   LIPASE   LACTIC ACID, PLASMA   PROCALCITONIN   POCT URINE PREGNANCY   SARS-COV-2 RDRP GENE    Narrative:     This test utilizes isothermal nucleic acid amplification technology to detect the SARS-CoV-2 RdRp nucleic acid segment. The analytical sensitivity (limit of detection) is 500 copies/swab.     A POSITIVE result is indicative of the presence of SARS-CoV-2 RNA; clinical correlation with patient history and other diagnostic information is necessary to determine patient infection status.    A NEGATIVE result means that SARS-CoV-2 nucleic acids are not present above the limit of detection. A NEGATIVE result should be treated as presumptive. It does not rule out the possibility of COVID-19 and should not be the sole basis for treatment decisions. If COVID-19 is strongly suspected based on clinical and exposure history, re-testing using an alternate molecular assay should be considered.     This test is only for use under the Food and Drug Administration s Emergency Use Authorization (EUA).     Commercial kits are provided by [employer]. Performance characteristics of the EUA have been independently verified by [facility] Department of Pathology and Laboratory Medicine.   _________________________________________________________________   The authorized Fact Sheet for Healthcare Providers and the authorized Fact Sheet for Patients of the ID NOW COVID-19 are available on the FDA website:    https://www.fda.gov/media/468031/download      https://www.fda.gov/media/686828/download             Imaging Results               CT Abdomen Pelvis With Contrast (Final result)  Result time 02/11/23  22:07:03      Final result by Cheko Paula MD (02/11/23 22:07:03)                   Impression:      1. Acute appendicitis.  Recommend surgical consultation and follow-up.  2. 2.1 cm minimally complex probable involuting right ovarian cyst.  3. Hepatomegaly.  4. Small fat containing umbilical hernia.  5. Nondistention of the sigmoid colon and rectum.  Minimal wall thickening is not excluded  6.  This report was flagged in Epic as abnormal.      Electronically signed by: Cheko Paula  Date:    02/11/2023  Time:    22:07               Narrative:    EXAMINATION:  CT ABDOMEN PELVIS WITH CONTRAST    CLINICAL HISTORY:  Nausea/vomiting;Abdominal pain, acute, nonlocalized;    TECHNIQUE:  Low dose axial images, sagittal and coronal reformations were obtained from the lung bases to the pubic symphysis following the IV administration of 70 mL of Omnipaque 350 .  Oral contrast was not administered.    COMPARISON:  None.    FINDINGS:  Abdomen:    - Lower thorax:    - Lung bases: No infiltrates and no nodules.    - Liver: Liver is enlarged.  No focal abnormality.    - Gallbladder: No calcified gallstones.    - Bile Ducts: No evidence of intra or extra hepatic biliary ductal dilation.    - Spleen: Negative.    - Kidneys: No mass or hydronephrosis.    - Adrenals: Unremarkable.    - Pancreas: No mass or peripancreatic fat stranding.    - Retroperitoneum:  No significant adenopathy.    - Vascular: No abdominal aortic aneurysm.    - Abdominal wall:  Small fat containing umbilical hernia.    Pelvis:    Urinary bladder is within normal limits.    The uterus is midline.  Ovaries are normal in size.  2.1 cm minimally complex probable involuting right ovarian cyst on axial 136.  No significant free fluid or adenopathy.    Bowel/Mesentery:    No evidence of bowel obstruction.  Nondistention of the sigmoid colon and rectum.  Minimal wall thickening is not excluded.    The appendix is dilated to approximately 12 mm in the right pelvis.   There is mild wall thickening and mild inflammatory stranding.  Findings are consistent with acute appendicitis.  Recommend surgical consultation and follow-up.    No evidence of perforation or abscess formation.    Bones:  No acute osseous abnormality and no suspicious lytic or blastic lesion.                                       Medications   piperacillin-tazobactam (ZOSYN) 4.5 g in dextrose 5 % in water (D5W) 5 % 100 mL IVPB (MB+) (4.5 g Intravenous New Bag 2/11/23 2258)   sodium chloride 0.9% bolus 1,000 mL 1,000 mL (1,000 mLs Intravenous New Bag 2/11/23 2257)   LIDOcaine (PF) 10 mg/ml (1%) injection 10 mg (has no administration in time range)   sodium chloride 0.9% flush 10 mL (has no administration in time range)   ondansetron disintegrating tablet 8 mg (has no administration in time range)   melatonin tablet 6 mg (has no administration in time range)   acetaminophen tablet 650 mg (has no administration in time range)   lactated ringers infusion (has no administration in time range)   piperacillin-tazobactam (ZOSYN) 4.5 g in dextrose 5 % in water (D5W) 5 % 100 mL IVPB (MB+) (has no administration in time range)   morphine injection 2 mg (has no administration in time range)   morphine injection 4 mg (has no administration in time range)   ketorolac injection 15 mg (has no administration in time range)   sodium chloride 0.9% bolus 1,000 mL 1,000 mL (0 mLs Intravenous Stopped 2/11/23 2207)   ondansetron injection 8 mg (8 mg Intravenous Given 2/11/23 2101)   dicyclomine injection 20 mg (20 mg Intramuscular Given 2/11/23 2101)   iohexoL (OMNIPAQUE 350) injection 70 mL (70 mLs Intravenous Given 2/11/23 2149)     Medical Decision Making:   History:   Old Medical Records: I decided to obtain old medical records.  Clinical Tests:   Lab Tests: Ordered and Reviewed  Radiological Study: Ordered and Reviewed  ED Management:  Patient with nausea, vomiting, abdominal pain since early this morning.  CBC with leukocytosis  and elevated ANC concerning for possible infection.  Ordered lactate, blood cultures, urine culture, procalcitonin.  Patient has been somewhat tachycardic.  Otherwise vitals are stable.  Patient afebrile.  Ordered IV fluids 30 milliliters/kilogram plus additional bolus for a total of 2000 mL.  CMP without concerning abnormalities.  Urinalysis without evidence of infection, however the color of the urine has affected test results.  Initial lactic acid within normal limits.  Procalcitonin still pending.  CT with evidence of acute appendicitis.  No evidence of perforation or other associated abnormality.  Ordered Zosyn.  Pain and nausea controlled with Toradol and Zofran.  Case discussed with Dr. Talia Dumont, general surgery resident.  Patient will be placed in observation for further management by General surgery.        Scribe Attestation:   Scribe #1: I performed the above scribed service and the documentation accurately describes the services I performed. I attest to the accuracy of the note.                   Clinical Impression:   Final diagnoses:  [K35.80] Acute appendicitis (Primary)  [R11.2] Nausea and vomiting, unspecified vomiting type  [R10.9] Abdominal pain, unspecified abdominal location  [R00.0] Tachycardia        ED Disposition Condition    Observation            I, Kye Kline NP, personally performed the services described in this documentation. All medical record entries made by the scribe were at my direction and in my presence. I have reviewed the chart and agree that the record reflects my personal performance and is accurate and complete.       Kye Kline NP  02/11/23 6131

## 2023-02-12 NOTE — OP NOTE
[facility] - Surgery  Operative Note    SUMMARY     Surgery Date: 2/12/2023     Surgeon(s) and Role:     * Flako Ruelas MD - Primary    Assisting Surgeon: Talia Dumont    Pre-op Diagnosis:  Acute appendicitis, unspecified acute appendicitis type [K35.80]    Post-op Diagnosis:  Post-Op Diagnosis Codes:     * Acute appendicitis, unspecified acute appendicitis type [K35.80]    Procedure(s) (LRB):  APPENDECTOMY, LAPAROSCOPIC (N/A)    Anesthesia: General    Indication for procedure: Ese Martinez is 22 y.o. female with acute appendicitis. After discussion of disease process, we discussed options and have elected for operation to perform lap appy.    Description of Procedure: After consent was obtained, patient was taken to the OR. The abdomen was prepped and draped in a standard sterile fashion after general anesthesia was started. Time out was performed. Antibiotics were started with zosyn. We began the procedure by incising supraumbilical location and sharply entering the peritoneal cavity. We found a necrotic appendix. There was some turbid fluid, suctioned out. We used the harmonic to take down the mesoappendix. We were being very gentle with the appendix due to a friable appearance. The base was avulsed and we used 2 pds endoloops to securely clamp and close the base. This was closed thoroughly. We were hemostatic. We removed the appendix with an endocatch bag. There was spillage of pus and contents of the appendix. This was washed out thoroughly. The base of the cecum looked good, no inflammation. We removed the trocars and pneumoperitoneum. We closed the fascia with interrupted 0 ethibond sutures and closed the skin with subcuticular 4-0 monocryl sutures, covered with dermabond.  All counts were correct x2. Patient was awakened from anesthesia and taken to the recovery room in a stable condition having suffered no issues at this time.    Description of the findings of the procedure:   Necrotic  gangrenous appendix. Excised completely. The base was secured with endoloop.    Estimated Blood Loss: 5 mL         Specimens:   Specimen (24h ago, onward)       Start     Ordered    02/12/23 1141  Specimen to Pathology, Surgery General Surgery  Once        Comments: Pre-op Diagnosis: Acute appendicitis, unspecified acute appendicitis type [K35.80]Procedure(s):APPENDECTOMY, LAPAROSCOPIC Number of specimens: 1Name of specimens: appendix     References:    Click here for ordering Quick Tip   Question Answer Comment   Procedure Type: General Surgery    Which provider would you like to cc? CLEO MTZ    Release to patient Immediate        02/12/23 1140                    Drains/Implants: None

## 2023-02-12 NOTE — TRANSFER OF CARE
"Anesthesia Transfer of Care Note    Patient: Ese Martinez    Procedure(s) Performed: Procedure(s) (LRB):  APPENDECTOMY, LAPAROSCOPIC (N/A)    Patient location: PACU    Anesthesia Type: general    Transport from OR: Transported from OR on room air with adequate spontaneous ventilation    Post pain: adequate analgesia    Post assessment: no apparent anesthetic complications and tolerated procedure well    Post vital signs: stable    Level of consciousness: sedated and responds to stimulation    Nausea/Vomiting: no nausea/vomiting    Complications: none    Transfer of care protocol was followed      Last vitals:   Visit Vitals  BP (!) 98/53 (BP Location: Right arm)   Pulse (!) 115   Temp 37.7 °C (99.9 °F) (Temporal)   Resp 18   Ht 5' 1" (1.549 m)   Wt 61 kg (134 lb 7.7 oz)   SpO2 98%   Breastfeeding No   BMI 25.41 kg/m²     "

## 2023-02-12 NOTE — ASSESSMENT & PLAN NOTE
22 y.o. female with no significant past medical history who was admitted for acute appendicitis.     - Plan for OR today for lap appy. Written consent obtained using   - NPO for procedure  - IV antibiotics  - Prn pain medication and anti-emetics

## 2023-02-12 NOTE — ED TRIAGE NOTES
Pt to ED with c/o abdominal pain, N/V, and diarrhea since 0300 yesterday morning. Pt reports LLQ pain radiating to RLQ. No meds taken PTA. Pt has slight HASSAN and dysuria.

## 2023-02-12 NOTE — ANESTHESIA PROCEDURE NOTES
Intubation    Date/Time: 2/12/2023 10:56 AM  Performed by: Kingston Garcia CRNA  Authorized by: Dayday Nuno MD     Intubation:     Induction:  Intravenous    Intubated:  Postinduction    Mask Ventilation:  Easy mask    Attempts:  1    Attempted By:  CRNA    Method of Intubation:  Video laryngoscopy    Blade:  Laughlin 3    Laryngeal View Grade: Grade I - full view of cords      Difficult Airway Encountered?: No      Complications:  None    Airway Device:  Oral endotracheal tube    Airway Device Size:  7.0    Style/Cuff Inflation:  Cuffed (inflated to minimal occlusive pressure)    Inflation Amount (mL):  5    Tube secured:  21    Secured at:  The lips    Placement Verified By:  Capnometry    Complicating Factors:  None    Findings Post-Intubation:  BS equal bilateral and atraumatic/condition of teeth unchanged

## 2023-02-12 NOTE — ANESTHESIA PREPROCEDURE EVALUATION
02/12/2023  Ese Martinez is a 22 y.o., female.      Pre-op Assessment    I have reviewed the Patient Summary Reports.     I have reviewed the Nursing Notes.       Review of Systems  Anesthesia Hx:  No problems with previous Anesthesia  Denies Family Hx of Anesthesia complications.   Denies Personal Hx of Anesthesia complications.   Cardiovascular:   Exercise tolerance: good Denies Hypertension.  Denies Dysrhythmias.   Denies CHF.    Pulmonary:  Pulmonary Normal    Renal/:  Renal/ Normal     Hepatic/GI:   Acute appendicitis   Neurological:  Neurology Normal    Endocrine:  Endocrine Normal        Physical Exam  General: Well nourished, Cooperative, Alert and Oriented    Airway:  Mallampati: II   Mouth Opening: Small, but > 3cm  TM Distance: 4 - 6 cm  Tongue: Normal  Neck ROM: Normal ROM    Dental:  Intact    Chest/Lungs:  Clear to auscultation, Normal Respiratory Rate    Heart:  Rate: Tachycardia  Rhythm: Regular Rhythm      Wt Readings from Last 3 Encounters:   02/12/23 61 kg (134 lb 7.7 oz)     Temp Readings from Last 3 Encounters:   02/12/23 37.7 °C (99.8 °F) (Oral)     BP Readings from Last 3 Encounters:   02/12/23 (!) 108/58     Pulse Readings from Last 3 Encounters:   02/12/23 (!) 126     Lab Results   Component Value Date    WBC 19.38 (H) 02/12/2023    HGB 10.3 (L) 02/12/2023    HCT 31.2 (L) 02/12/2023    MCV 86 02/12/2023     02/12/2023       CMP  Sodium   Date Value Ref Range Status   02/12/2023 139 136 - 145 mmol/L Final     Potassium   Date Value Ref Range Status   02/12/2023 3.7 3.5 - 5.1 mmol/L Final     Chloride   Date Value Ref Range Status   02/12/2023 109 95 - 110 mmol/L Final     CO2   Date Value Ref Range Status   02/12/2023 22 (L) 23 - 29 mmol/L Final     Glucose   Date Value Ref Range Status   02/12/2023 113 (H) 70 - 110 mg/dL Final     BUN   Date Value Ref Range  Status   02/12/2023 11 6 - 20 mg/dL Final     Creatinine   Date Value Ref Range Status   02/12/2023 0.7 0.5 - 1.4 mg/dL Final     Calcium   Date Value Ref Range Status   02/12/2023 8.0 (L) 8.7 - 10.5 mg/dL Final     Total Protein   Date Value Ref Range Status   02/11/2023 9.3 (H) 6.0 - 8.4 g/dL Final     Albumin   Date Value Ref Range Status   02/11/2023 4.6 3.5 - 5.2 g/dL Final     Total Bilirubin   Date Value Ref Range Status   02/11/2023 1.2 (H) 0.1 - 1.0 mg/dL Final     Comment:     For infants and newborns, interpretation of results should be based  on gestational age, weight and in agreement with clinical  observations.    Premature Infant recommended reference ranges:  Up to 24 hours.............<8.0 mg/dL  Up to 48 hours............<12.0 mg/dL  3-5 days..................<15.0 mg/dL  6-29 days.................<15.0 mg/dL       Alkaline Phosphatase   Date Value Ref Range Status   02/11/2023 102 55 - 135 U/L Final     AST   Date Value Ref Range Status   02/11/2023 15 10 - 40 U/L Final     ALT   Date Value Ref Range Status   02/11/2023 10 10 - 44 U/L Final     Anion Gap   Date Value Ref Range Status   02/12/2023 8 8 - 16 mmol/L Final     eGFR   Date Value Ref Range Status   02/12/2023 >60 >60 mL/min/1.73 m^2 Final     2/11/2023 UPT negative  2/11/2023 COVID negative    Anesthesia Plan  Type of Anesthesia, risks & benefits discussed:    Anesthesia Type: Gen ETT  Intra-op Monitoring Plan: Standard ASA Monitors  Post Op Pain Control Plan: multimodal analgesia and IV/PO Opioids PRN  Induction:  IV  Informed Consent: Informed consent signed with the Patient and all parties understand the risks and agree with anesthesia plan.  All questions answered. Patient consented to blood products? Yes  ASA Score: 1  Day of Surgery Review of History & Physical: H&P Update referred to the surgeon/provider.  Anesthesia Plan Notes: H&P and informed consent obtained with help from French interpretor Diana, ID# [id number].    Ready For  Surgery From Anesthesia Perspective.     .

## 2023-02-12 NOTE — HPI
Patient is a 22 y.o. female with no significant past medical history who was admitted for acute appendicitis. Patient states that she began having LUQ pain and emesis at 3 am yesterday morning. She reports being in usual state of health prior to that. States her pain progressed and she was unable to keep anything down so she went to the ED. Pain relocated to her lower abdomen. Tachycardic on admission. WBC on labs 27 and CT with findings consistent with non-perforated acute appendicitis.

## 2023-02-12 NOTE — PLAN OF CARE
[place] [facility] S[facility]  Discharge Assessment    Primary Care Provider: Primary Doctor No     Discharge Assessment (most recent)       BRIEF DISCHARGE ASSESSMENT - 02/12/23 1432          Discharge Planning    Assessment Type Discharge Planning Brief Assessment     Resource/Environmental Concerns none     Support Systems Parent     Equipment Currently Used at Home none     Current Living Arrangements apartment     Patient/Family Anticipates Transition to home with family     Patient/Family Anticipated Services at Transition none     DME Needed Upon Discharge  none     Discharge Plan A Home with family     Discharge Plan B --   n/a

## 2023-02-12 NOTE — NURSING
Report received and patient arrived on unit via bed. Sites to abdomen with durabond intact.  used to communicate. NPO status maintain . Encouragement with ambulation continues. Pain is 2/10 PRS to abdomen. States it is soreness from surgery

## 2023-02-13 LAB
ANION GAP SERPL CALC-SCNC: 8 MMOL/L (ref 8–16)
BACTERIA UR CULT: ABNORMAL
BASOPHILS # BLD AUTO: 0.03 K/UL (ref 0–0.2)
BASOPHILS NFR BLD: 0.2 % (ref 0–1.9)
BUN SERPL-MCNC: 7 MG/DL (ref 6–20)
CALCIUM SERPL-MCNC: 8.5 MG/DL (ref 8.7–10.5)
CHLORIDE SERPL-SCNC: 109 MMOL/L (ref 95–110)
CO2 SERPL-SCNC: 22 MMOL/L (ref 23–29)
CREAT SERPL-MCNC: 0.7 MG/DL (ref 0.5–1.4)
DIFFERENTIAL METHOD: ABNORMAL
EOSINOPHIL # BLD AUTO: 0 K/UL (ref 0–0.5)
EOSINOPHIL NFR BLD: 0.1 % (ref 0–8)
ERYTHROCYTE [DISTWIDTH] IN BLOOD BY AUTOMATED COUNT: 13.2 % (ref 11.5–14.5)
EST. GFR  (NO RACE VARIABLE): >60 ML/MIN/1.73 M^2
GLUCOSE SERPL-MCNC: 99 MG/DL (ref 70–110)
HCT VFR BLD AUTO: 26.7 % (ref 37–48.5)
HGB BLD-MCNC: 8.7 G/DL (ref 12–16)
IMM GRANULOCYTES # BLD AUTO: 0.13 K/UL (ref 0–0.04)
IMM GRANULOCYTES NFR BLD AUTO: 0.8 % (ref 0–0.5)
LYMPHOCYTES # BLD AUTO: 1.3 K/UL (ref 1–4.8)
LYMPHOCYTES NFR BLD: 7.4 % (ref 18–48)
MAGNESIUM SERPL-MCNC: 2 MG/DL (ref 1.6–2.6)
MCH RBC QN AUTO: 28.5 PG (ref 27–31)
MCHC RBC AUTO-ENTMCNC: 32.6 G/DL (ref 32–36)
MCV RBC AUTO: 88 FL (ref 82–98)
MONOCYTES # BLD AUTO: 1.2 K/UL (ref 0.3–1)
MONOCYTES NFR BLD: 7.2 % (ref 4–15)
NEUTROPHILS # BLD AUTO: 14.3 K/UL (ref 1.8–7.7)
NEUTROPHILS NFR BLD: 84.3 % (ref 38–73)
NRBC BLD-RTO: 0 /100 WBC
PHOSPHATE SERPL-MCNC: 3 MG/DL (ref 2.7–4.5)
PLATELET # BLD AUTO: 239 K/UL (ref 150–450)
PMV BLD AUTO: 9.5 FL (ref 9.2–12.9)
POTASSIUM SERPL-SCNC: 3.8 MMOL/L (ref 3.5–5.1)
RBC # BLD AUTO: 3.05 M/UL (ref 4–5.4)
SODIUM SERPL-SCNC: 139 MMOL/L (ref 136–145)
WBC # BLD AUTO: 16.98 K/UL (ref 3.9–12.7)

## 2023-02-13 PROCEDURE — 96366 THER/PROPH/DIAG IV INF ADDON: CPT

## 2023-02-13 PROCEDURE — 85025 COMPLETE CBC W/AUTO DIFF WBC: CPT | Performed by: STUDENT IN AN ORGANIZED HEALTH CARE EDUCATION/TRAINING PROGRAM

## 2023-02-13 PROCEDURE — 83735 ASSAY OF MAGNESIUM: CPT | Performed by: STUDENT IN AN ORGANIZED HEALTH CARE EDUCATION/TRAINING PROGRAM

## 2023-02-13 PROCEDURE — 11000001 HC ACUTE MED/SURG PRIVATE ROOM

## 2023-02-13 PROCEDURE — 84100 ASSAY OF PHOSPHORUS: CPT | Performed by: STUDENT IN AN ORGANIZED HEALTH CARE EDUCATION/TRAINING PROGRAM

## 2023-02-13 PROCEDURE — 36415 COLL VENOUS BLD VENIPUNCTURE: CPT | Performed by: STUDENT IN AN ORGANIZED HEALTH CARE EDUCATION/TRAINING PROGRAM

## 2023-02-13 PROCEDURE — 63600175 PHARM REV CODE 636 W HCPCS: Performed by: STUDENT IN AN ORGANIZED HEALTH CARE EDUCATION/TRAINING PROGRAM

## 2023-02-13 PROCEDURE — 96361 HYDRATE IV INFUSION ADD-ON: CPT

## 2023-02-13 PROCEDURE — 96376 TX/PRO/DX INJ SAME DRUG ADON: CPT

## 2023-02-13 PROCEDURE — 25000003 PHARM REV CODE 250: Performed by: STUDENT IN AN ORGANIZED HEALTH CARE EDUCATION/TRAINING PROGRAM

## 2023-02-13 PROCEDURE — 80048 BASIC METABOLIC PNL TOTAL CA: CPT | Performed by: STUDENT IN AN ORGANIZED HEALTH CARE EDUCATION/TRAINING PROGRAM

## 2023-02-13 RX ORDER — OXYCODONE HYDROCHLORIDE 5 MG/1
5 TABLET ORAL EVERY 4 HOURS PRN
Status: DISCONTINUED | OUTPATIENT
Start: 2023-02-13 | End: 2023-02-16 | Stop reason: HOSPADM

## 2023-02-13 RX ORDER — OXYCODONE HYDROCHLORIDE 5 MG/1
10 TABLET ORAL EVERY 4 HOURS PRN
Status: DISCONTINUED | OUTPATIENT
Start: 2023-02-13 | End: 2023-02-16 | Stop reason: HOSPADM

## 2023-02-13 RX ADMIN — ACETAMINOPHEN 650 MG: 325 TABLET ORAL at 07:02

## 2023-02-13 RX ADMIN — PIPERACILLIN AND TAZOBACTAM 4.5 G: 4; .5 INJECTION, POWDER, LYOPHILIZED, FOR SOLUTION INTRAVENOUS; PARENTERAL at 06:02

## 2023-02-13 RX ADMIN — PIPERACILLIN AND TAZOBACTAM 4.5 G: 4; .5 INJECTION, POWDER, LYOPHILIZED, FOR SOLUTION INTRAVENOUS; PARENTERAL at 11:02

## 2023-02-13 RX ADMIN — PIPERACILLIN AND TAZOBACTAM 4.5 G: 4; .5 INJECTION, POWDER, LYOPHILIZED, FOR SOLUTION INTRAVENOUS; PARENTERAL at 02:02

## 2023-02-13 RX ADMIN — OXYCODONE 10 MG: 5 TABLET ORAL at 02:02

## 2023-02-13 RX ADMIN — SODIUM CHLORIDE, POTASSIUM CHLORIDE, SODIUM LACTATE AND CALCIUM CHLORIDE: 600; 310; 30; 20 INJECTION, SOLUTION INTRAVENOUS at 07:02

## 2023-02-13 RX ADMIN — MORPHINE SULFATE 4 MG: 4 INJECTION, SOLUTION INTRAMUSCULAR; INTRAVENOUS at 02:02

## 2023-02-13 RX ADMIN — SODIUM CHLORIDE, POTASSIUM CHLORIDE, SODIUM LACTATE AND CALCIUM CHLORIDE: 600; 310; 30; 20 INJECTION, SOLUTION INTRAVENOUS at 04:02

## 2023-02-13 RX ADMIN — OXYCODONE 10 MG: 5 TABLET ORAL at 06:02

## 2023-02-13 RX ADMIN — ACETAMINOPHEN 650 MG: 325 TABLET ORAL at 02:02

## 2023-02-13 NOTE — ASSESSMENT & PLAN NOTE
22 y.o. female with no significant past medical history who was admitted for acute appendicitis. S/p lap appy 2/12 - found to have perforated appendicitis intra-op.     - Okay for CLD   - Continue IV antibiotics   - PRN antiemetics and pain medication   - Encourage ambulation   - IS

## 2023-02-13 NOTE — NURSING
Received handoff. Complaints of abdominal pain, pain medications given @ 1704. Cont IVF. Safety measures maintained. Will cont to monitor

## 2023-02-13 NOTE — NURSING
Pt resting in bed watching.  used, Ellie #[id number]. No complaints of pain. Cont IVF. IV antibiotics infusing. x3 lap sites; no redness/drainage. Remains free from fall/injury. Safety measures maintained. Will cont to monitor

## 2023-02-13 NOTE — PLAN OF CARE
Problem: Pain Acute  Goal: Acceptable Pain Control and Functional Ability  Intervention: Develop Pain Management Plan  Flowsheets (Taken 2/12/2023 1908)  Pain Management Interventions:   care clustered   diversional activity provided   pain management plan reviewed with patient/caregiver   relaxation techniques promoted     Problem: Pain Acute  Goal: Acceptable Pain Control and Functional Ability  Intervention: Prevent or Manage Pain  Flowsheets (Taken 2/12/2023 1908)  Sleep/Rest Enhancement:   regular sleep/rest pattern promoted   relaxation techniques promoted  Medication Review/Management:   medications reviewed   high-risk medications identified

## 2023-02-13 NOTE — SUBJECTIVE & OBJECTIVE
Interval History:   No acute events overnight.   POD 1 from lap appy. Appendix found to be perforated.   States improvement in pre-op pain, mainly incisional pain now. Medication helps.   Denies any nausea/vomiting.   Not passing flatus yet.     Medications:  Continuous Infusions:   lactated ringers 125 mL/hr at 02/13/23 0437     Scheduled Meds:   piperacillin-tazobactam (ZOSYN) IVPB  4.5 g Intravenous Q8H     PRN Meds:acetaminophen, LIDOcaine (PF) 10 mg/ml (1%), melatonin, morphine, morphine, ondansetron, sodium chloride 0.9%     Review of patient's allergies indicates:  No Known Allergies  Objective:     Vital Signs (Most Recent):  Temp: 98.3 °F (36.8 °C) (02/13/23 0749)  Pulse: 89 (02/13/23 0749)  Resp: 20 (02/13/23 0749)  BP: (!) 100/58 (02/13/23 0749)  SpO2: 97 % (02/13/23 0749)   Vital Signs (24h Range):  Temp:  [97.4 °F (36.3 °C)-99.9 °F (37.7 °C)] 98.3 °F (36.8 °C)  Pulse:  [] 89  Resp:  [13-20] 20  SpO2:  [95 %-100 %] 97 %  BP: ()/(52-62) 100/58     Weight: 61 kg (134 lb 7.7 oz)  Body mass index is 25.41 kg/m².    Intake/Output - Last 3 Shifts         02/11 0700 02/12 0659 02/12 0700 02/13 0659 02/13 0700 02/14 0659    P.O.  120 0    IV Piggyback  1250     Total Intake(mL/kg)  1370 (22.5) 0 (0)    Urine (mL/kg/hr)  80 (0.1)     Blood  5     Total Output  85     Net  +1285 0           Urine Occurrence 1 x 2 x     Stool Occurrence 0 x              Physical Exam  Constitutional:       General: She is not in acute distress.  HENT:      Head: Normocephalic and atraumatic.   Eyes:      Extraocular Movements: Extraocular movements intact.      Conjunctiva/sclera: Conjunctivae normal.      Pupils: Pupils are equal, round, and reactive to light.   Cardiovascular:      Rate and Rhythm: Normal rate and regular rhythm.   Pulmonary:      Effort: Pulmonary effort is normal. No respiratory distress.   Abdominal:      General: There is no distension.      Palpations: Abdomen is soft.      Comments:  Appropriate tenderness.   Incisions c/d/I.    Neurological:      General: No focal deficit present.      Mental Status: She is alert.       Significant Labs:  I have reviewed all pertinent lab results within the past 24 hours.  CBC:   Recent Labs   Lab 02/13/23  0626   WBC 16.98*   RBC 3.05*   HGB 8.7*   HCT 26.7*      MCV 88   MCH 28.5   MCHC 32.6     CMP:   Recent Labs   Lab 02/11/23 2056 02/12/23 0222 02/13/23  0626   *   < > 99   CALCIUM 9.6   < > 8.5*   ALBUMIN 4.6  --   --    PROT 9.3*  --   --       < > 139   K 3.9   < > 3.8   CO2 23   < > 22*      < > 109   BUN 14   < > 7   CREATININE 0.8   < > 0.7   ALKPHOS 102  --   --    ALT 10  --   --    AST 15  --   --    BILITOT 1.2*  --   --     < > = values in this interval not displayed.       Significant Diagnostics:  I have reviewed all pertinent imaging results/findings within the past 24 hours.

## 2023-02-13 NOTE — PROGRESS NOTES
[facility] Surg  General Surgery  Progress Note    Subjective:     History of Present Illness:  Patient is a 22 y.o. female with no significant past medical history who was admitted for acute appendicitis. Patient states that she began having LUQ pain and emesis at 3 am yesterday morning. She reports being in usual state of health prior to that. States her pain progressed and she was unable to keep anything down so she went to the ED. Pain relocated to her lower abdomen. Tachycardic on admission. WBC on labs 27 and CT with findings consistent with non-perforated acute appendicitis.       Post-Op Info:  Procedure(s) (LRB):  APPENDECTOMY, LAPAROSCOPIC (N/A)   1 Day Post-Op     Interval History:   No acute events overnight.   POD 1 from lap appy. Appendix found to be perforated.   States improvement in pre-op pain, mainly incisional pain now. Medication helps.   Denies any nausea/vomiting.   Not passing flatus yet.     Medications:  Continuous Infusions:   lactated ringers 125 mL/hr at 02/13/23 0437     Scheduled Meds:   piperacillin-tazobactam (ZOSYN) IVPB  4.5 g Intravenous Q8H     PRN Meds:acetaminophen, LIDOcaine (PF) 10 mg/ml (1%), melatonin, morphine, morphine, ondansetron, sodium chloride 0.9%     Review of patient's allergies indicates:  No Known Allergies  Objective:     Vital Signs (Most Recent):  Temp: 98.3 °F (36.8 °C) (02/13/23 0749)  Pulse: 89 (02/13/23 0749)  Resp: 20 (02/13/23 0749)  BP: (!) 100/58 (02/13/23 0749)  SpO2: 97 % (02/13/23 0749)   Vital Signs (24h Range):  Temp:  [97.4 °F (36.3 °C)-99.9 °F (37.7 °C)] 98.3 °F (36.8 °C)  Pulse:  [] 89  Resp:  [13-20] 20  SpO2:  [95 %-100 %] 97 %  BP: ()/(52-62) 100/58     Weight: 61 kg (134 lb 7.7 oz)  Body mass index is 25.41 kg/m².    Intake/Output - Last 3 Shifts         02/11 0700 02/12 0659 02/12 0700 02/13 0659 02/13 0700 02/14 0659    P.O.  120 0    IV Piggyback  1250     Total Intake(mL/kg)  1370 (22.5) 0 (0)    Urine (mL/kg/hr)   80 (0.1)     Blood  5     Total Output  85     Net  +1285 0           Urine Occurrence 1 x 2 x     Stool Occurrence 0 x              Physical Exam  Constitutional:       General: She is not in acute distress.  HENT:      Head: Normocephalic and atraumatic.   Eyes:      Extraocular Movements: Extraocular movements intact.      Conjunctiva/sclera: Conjunctivae normal.      Pupils: Pupils are equal, round, and reactive to light.   Cardiovascular:      Rate and Rhythm: Normal rate and regular rhythm.   Pulmonary:      Effort: Pulmonary effort is normal. No respiratory distress.   Abdominal:      General: There is no distension.      Palpations: Abdomen is soft.      Comments: Appropriate tenderness.   Incisions c/d/I.    Neurological:      General: No focal deficit present.      Mental Status: She is alert.       Significant Labs:  I have reviewed all pertinent lab results within the past 24 hours.  CBC:   Recent Labs   Lab 02/13/23  0626   WBC 16.98*   RBC 3.05*   HGB 8.7*   HCT 26.7*      MCV 88   MCH 28.5   MCHC 32.6     CMP:   Recent Labs   Lab 02/11/23 2056 02/12/23  0222 02/13/23  0626   *   < > 99   CALCIUM 9.6   < > 8.5*   ALBUMIN 4.6  --   --    PROT 9.3*  --   --       < > 139   K 3.9   < > 3.8   CO2 23   < > 22*      < > 109   BUN 14   < > 7   CREATININE 0.8   < > 0.7   ALKPHOS 102  --   --    ALT 10  --   --    AST 15  --   --    BILITOT 1.2*  --   --     < > = values in this interval not displayed.       Significant Diagnostics:  I have reviewed all pertinent imaging results/findings within the past 24 hours.    Assessment/Plan:     * Acute appendicitis  22 y.o. female with no significant past medical history who was admitted for acute appendicitis. S/p lap appy 2/12 - found to have perforated appendicitis intra-op.     - Okay for CLD   - Continue IV antibiotics   - PRN antiemetics and pain medication   - Encourage ambulation   - IS        Talia Dumont MD  General Surgery  [place]  [facility]

## 2023-02-14 PROCEDURE — 63600175 PHARM REV CODE 636 W HCPCS: Performed by: STUDENT IN AN ORGANIZED HEALTH CARE EDUCATION/TRAINING PROGRAM

## 2023-02-14 PROCEDURE — 25000003 PHARM REV CODE 250: Performed by: STUDENT IN AN ORGANIZED HEALTH CARE EDUCATION/TRAINING PROGRAM

## 2023-02-14 PROCEDURE — 11000001 HC ACUTE MED/SURG PRIVATE ROOM

## 2023-02-14 RX ORDER — AMOXICILLIN AND CLAVULANATE POTASSIUM 875; 125 MG/1; MG/1
1 TABLET, FILM COATED ORAL EVERY 12 HOURS
Status: DISCONTINUED | OUTPATIENT
Start: 2023-02-14 | End: 2023-02-15

## 2023-02-14 RX ADMIN — AMOXICILLIN AND CLAVULANATE POTASSIUM 1 TABLET: 875; 125 TABLET, FILM COATED ORAL at 10:02

## 2023-02-14 RX ADMIN — OXYCODONE 10 MG: 5 TABLET ORAL at 09:02

## 2023-02-14 RX ADMIN — AMOXICILLIN AND CLAVULANATE POTASSIUM 1 TABLET: 875; 125 TABLET, FILM COATED ORAL at 09:02

## 2023-02-14 RX ADMIN — PIPERACILLIN AND TAZOBACTAM 4.5 G: 4; .5 INJECTION, POWDER, LYOPHILIZED, FOR SOLUTION INTRAVENOUS; PARENTERAL at 06:02

## 2023-02-14 RX ADMIN — OXYCODONE 10 MG: 5 TABLET ORAL at 04:02

## 2023-02-14 RX ADMIN — ACETAMINOPHEN 650 MG: 325 TABLET ORAL at 04:02

## 2023-02-14 RX ADMIN — OXYCODONE 10 MG: 5 TABLET ORAL at 02:02

## 2023-02-14 NOTE — NURSING
Pt resting in bed.  used, Huong #[id number]. No complaints of pain/discomfort. IV antibiotics infusing. Cont IVF. Pt report to having a bm x1. Remains free from fall/injury. Safety measures maintained. Will cont to monitor

## 2023-02-14 NOTE — NURSING
Ambulated in hallway. Steady gait noted. Complains of pain to lower abdomen. Oxycodone 10 mg given

## 2023-02-14 NOTE — CARE UPDATE
RAPID RESPONSE NURSE PROACTIVE ROUNDING NOTE       Time of Visit: 0910    Admit Date: 2023  LOS: 1  Code Status: Full Code   Date of Visit: 2023  : 2000  Age: 22 y.o.  Sex: female  Race: Other  Bed: [facility] A:   MRN: [id number]  Was the patient discharged from an ICU this admission? No   Was the patient discharged from a PACU within last 24 hours? No   Did the patient receive conscious sedation/general anesthesia in last 24 hours? No   Was the patient in the ED within the past 24 hours? No   Was the patient on NIPPV within the past 24 hours? No   Attending Physician: Flako Ruelas MD  Primary Service: General Surgery,Surgery   Time spent at the bedside: 15 -30 min    SITUATION    Notified by Epic patient alert  Reason for alert: HR 130s    Diagnosis: Acute appendicitis   has no past medical history on file.    Last Vitals:  Temp: 100.2 °F (37.9 °C) (730)  Pulse: 132 (730)  Resp: 14 (730)  BP: 104/62 (730)  SpO2: 96 % (730)    24 Hour Vitals Range:  Temp:  [98.1 °F (36.7 °C)-102.6 °F (39.2 °C)]   Pulse:  []   Resp:  [14-20]   BP: (100-119)/(59-65)   SpO2:  [94 %-98 %]     Clinical Issues: Circulatory    ASSESSMENT/INTERVENTIONS    Upon assessment pt resting quietly recheck VS and  /68 denied pain at present time, abdomin surgical site CDI,encouraged pt to use IS and to ambulate as tolerated but pt stated that she feels dizzy when she gets up, instruct pt to call for assistance before getting out of bed,       RECOMMENDATIONS  Assist pt with activities     Discussed plan of care with charge RNAlie     PROVIDER ESCALATION    Physician escalation: No    Orders received and case discussed with NA.    Disposition:Remain in room 408    FOLLOW UP    Call back the Rapid Response NurseNel  at [phone] for additional questions or concerns.

## 2023-02-14 NOTE — PROGRESS NOTES
[facility] Surg  General Surgery  Progress Note    Subjective:     History of Present Illness:  Patient is a 22 y.o. female with no significant past medical history who was admitted for acute appendicitis. Patient states that she began having LUQ pain and emesis at 3 am yesterday morning. She reports being in usual state of health prior to that. States her pain progressed and she was unable to keep anything down so she went to the ED. Pain relocated to her lower abdomen. Tachycardic on admission. WBC on labs 27 and CT with findings consistent with non-perforated acute appendicitis.       Post-Op Info:  Procedure(s) (LRB):  APPENDECTOMY, LAPAROSCOPIC (N/A)   2 Days Post-Op     Interval History:   No acute events overnight.   Improved abdominal pain today. Controlled with medication.   Tolerating clears.   Passing flatus and having BM.   Ambulating.    Medications:  Continuous Infusions:      Scheduled Meds:   amoxicillin-clavulanate 875-125mg  1 tablet Oral Q12H     PRN Meds:acetaminophen, LIDOcaine (PF) 10 mg/ml (1%), melatonin, ondansetron, oxyCODONE, oxyCODONE, sodium chloride 0.9%     Review of patient's allergies indicates:  No Known Allergies  Objective:     Vital Signs (Most Recent):  Temp: 100.2 °F (37.9 °C) (02/14/23 0730)  Pulse: (!) 132 (02/14/23 0730)  Resp: 14 (02/14/23 0730)  BP: 104/62 (02/14/23 0730)  SpO2: 96 % (02/14/23 0730)   Vital Signs (24h Range):  Temp:  [98.1 °F (36.7 °C)-102.6 °F (39.2 °C)] 100.2 °F (37.9 °C)  Pulse:  [] 132  Resp:  [14-20] 14  SpO2:  [94 %-98 %] 96 %  BP: (100-119)/(59-65) 104/62     Weight: 61 kg (134 lb 7.7 oz)  Body mass index is 25.41 kg/m².    Intake/Output - Last 3 Shifts         02/12 0700  02/13 0659 02/13 0700  02/14 0659 02/14 0700  02/15 0659    P.O. 120 840     IV Piggyback 1250      Total Intake(mL/kg) 1370 (22.5) 840 (13.8)     Urine (mL/kg/hr) 80 (0.1)      Blood 5      Total Output 85      Net +1285 +840            Urine Occurrence 2 x 5 x      Stool Occurrence  1 x             Physical Exam  Constitutional:       General: She is not in acute distress.  HENT:      Head: Normocephalic and atraumatic.   Eyes:      Extraocular Movements: Extraocular movements intact.      Conjunctiva/sclera: Conjunctivae normal.      Pupils: Pupils are equal, round, and reactive to light.   Cardiovascular:      Rate and Rhythm: Normal rate and regular rhythm.   Pulmonary:      Effort: Pulmonary effort is normal. No respiratory distress.   Abdominal:      General: There is no distension.      Palpations: Abdomen is soft.      Comments: Appropriate tenderness.   Incisions c/d/I.    Neurological:      General: No focal deficit present.      Mental Status: She is alert.       Significant Labs:  I have reviewed all pertinent lab results within the past 24 hours.  CBC:   Recent Labs   Lab 02/13/23  0626   WBC 16.98*   RBC 3.05*   HGB 8.7*   HCT 26.7*      MCV 88   MCH 28.5   MCHC 32.6       CMP:   Recent Labs   Lab 02/11/23  2056 02/12/23  0222 02/13/23  0626   *   < > 99   CALCIUM 9.6   < > 8.5*   ALBUMIN 4.6  --   --    PROT 9.3*  --   --       < > 139   K 3.9   < > 3.8   CO2 23   < > 22*      < > 109   BUN 14   < > 7   CREATININE 0.8   < > 0.7   ALKPHOS 102  --   --    ALT 10  --   --    AST 15  --   --    BILITOT 1.2*  --   --     < > = values in this interval not displayed.         Significant Diagnostics:  I have reviewed all pertinent imaging results/findings within the past 24 hours.    Assessment/Plan:     * Acute appendicitis  22 y.o. female with no significant past medical history who was admitted for acute appendicitis. S/p lap appy 2/12 - found to have perforated appendicitis intra-op.     - Okay for regular diet  - transition to PO antibiotics  - PRN antiemetics and pain medication   - Encourage ambulation   - IS    Dispo: possible d/c tomorrow if tolerating diet and remains stable on PO antibiotics.         Talia Dumont MD  General  Surgery  [facility] Surg

## 2023-02-14 NOTE — SUBJECTIVE & OBJECTIVE
Interval History:   No acute events overnight.   Improved abdominal pain today. Controlled with medication.   Tolerating clears.   Passing flatus and having BM.   Ambulating.    Medications:  Continuous Infusions:      Scheduled Meds:   amoxicillin-clavulanate 875-125mg  1 tablet Oral Q12H     PRN Meds:acetaminophen, LIDOcaine (PF) 10 mg/ml (1%), melatonin, ondansetron, oxyCODONE, oxyCODONE, sodium chloride 0.9%     Review of patient's allergies indicates:  No Known Allergies  Objective:     Vital Signs (Most Recent):  Temp: 100.2 °F (37.9 °C) (02/14/23 0730)  Pulse: (!) 132 (02/14/23 0730)  Resp: 14 (02/14/23 0730)  BP: 104/62 (02/14/23 0730)  SpO2: 96 % (02/14/23 0730)   Vital Signs (24h Range):  Temp:  [98.1 °F (36.7 °C)-102.6 °F (39.2 °C)] 100.2 °F (37.9 °C)  Pulse:  [] 132  Resp:  [14-20] 14  SpO2:  [94 %-98 %] 96 %  BP: (100-119)/(59-65) 104/62     Weight: 61 kg (134 lb 7.7 oz)  Body mass index is 25.41 kg/m².    Intake/Output - Last 3 Shifts         02/12 0700  02/13 0659 02/13 0700  02/14 0659 02/14 0700  02/15 0659    P.O. 120 840     IV Piggyback 1250      Total Intake(mL/kg) 1370 (22.5) 840 (13.8)     Urine (mL/kg/hr) 80 (0.1)      Blood 5      Total Output 85      Net +1285 +840            Urine Occurrence 2 x 5 x     Stool Occurrence  1 x             Physical Exam  Constitutional:       General: She is not in acute distress.  HENT:      Head: Normocephalic and atraumatic.   Eyes:      Extraocular Movements: Extraocular movements intact.      Conjunctiva/sclera: Conjunctivae normal.      Pupils: Pupils are equal, round, and reactive to light.   Cardiovascular:      Rate and Rhythm: Normal rate and regular rhythm.   Pulmonary:      Effort: Pulmonary effort is normal. No respiratory distress.   Abdominal:      General: There is no distension.      Palpations: Abdomen is soft.      Comments: Appropriate tenderness.   Incisions c/d/I.    Neurological:      General: No focal deficit present.      Mental  Status: She is alert.       Significant Labs:  I have reviewed all pertinent lab results within the past 24 hours.  CBC:   Recent Labs   Lab 02/13/23  0626   WBC 16.98*   RBC 3.05*   HGB 8.7*   HCT 26.7*      MCV 88   MCH 28.5   MCHC 32.6       CMP:   Recent Labs   Lab 02/11/23 2056 02/12/23 0222 02/13/23  0626   *   < > 99   CALCIUM 9.6   < > 8.5*   ALBUMIN 4.6  --   --    PROT 9.3*  --   --       < > 139   K 3.9   < > 3.8   CO2 23   < > 22*      < > 109   BUN 14   < > 7   CREATININE 0.8   < > 0.7   ALKPHOS 102  --   --    ALT 10  --   --    AST 15  --   --    BILITOT 1.2*  --   --     < > = values in this interval not displayed.         Significant Diagnostics:  I have reviewed all pertinent imaging results/findings within the past 24 hours.

## 2023-02-14 NOTE — ASSESSMENT & PLAN NOTE
22 y.o. female with no significant past medical history who was admitted for acute appendicitis. S/p lap appy 2/12 - found to have perforated appendicitis intra-op.     - Okay for regular diet  - transition to PO antibiotics  - PRN antiemetics and pain medication   - Encourage ambulation   - IS    Dispo: possible d/c tomorrow if tolerating diet and remains stable on PO antibiotics.

## 2023-02-15 LAB
BACTERIA BLD CULT: NORMAL
BACTERIA BLD CULT: NORMAL
BASOPHILS # BLD AUTO: 0.02 K/UL (ref 0–0.2)
BASOPHILS NFR BLD: 0.2 % (ref 0–1.9)
DIFFERENTIAL METHOD: ABNORMAL
EOSINOPHIL # BLD AUTO: 0.3 K/UL (ref 0–0.5)
EOSINOPHIL NFR BLD: 2.5 % (ref 0–8)
ERYTHROCYTE [DISTWIDTH] IN BLOOD BY AUTOMATED COUNT: 12.8 % (ref 11.5–14.5)
FINAL PATHOLOGIC DIAGNOSIS: NORMAL
GROSS: NORMAL
HCT VFR BLD AUTO: 29.7 % (ref 37–48.5)
HGB BLD-MCNC: 9.4 G/DL (ref 12–16)
IMM GRANULOCYTES # BLD AUTO: 0.08 K/UL (ref 0–0.04)
IMM GRANULOCYTES NFR BLD AUTO: 0.8 % (ref 0–0.5)
LYMPHOCYTES # BLD AUTO: 1.1 K/UL (ref 1–4.8)
LYMPHOCYTES NFR BLD: 10.1 % (ref 18–48)
Lab: NORMAL
MCH RBC QN AUTO: 27.9 PG (ref 27–31)
MCHC RBC AUTO-ENTMCNC: 31.6 G/DL (ref 32–36)
MCV RBC AUTO: 88 FL (ref 82–98)
MONOCYTES # BLD AUTO: 1 K/UL (ref 0.3–1)
MONOCYTES NFR BLD: 9.7 % (ref 4–15)
NEUTROPHILS # BLD AUTO: 8 K/UL (ref 1.8–7.7)
NEUTROPHILS NFR BLD: 76.7 % (ref 38–73)
NRBC BLD-RTO: 0 /100 WBC
PLATELET # BLD AUTO: 317 K/UL (ref 150–450)
PMV BLD AUTO: 9.9 FL (ref 9.2–12.9)
RBC # BLD AUTO: 3.37 M/UL (ref 4–5.4)
WBC # BLD AUTO: 10.47 K/UL (ref 3.9–12.7)

## 2023-02-15 PROCEDURE — 25000003 PHARM REV CODE 250: Performed by: STUDENT IN AN ORGANIZED HEALTH CARE EDUCATION/TRAINING PROGRAM

## 2023-02-15 PROCEDURE — 85025 COMPLETE CBC W/AUTO DIFF WBC: CPT | Performed by: STUDENT IN AN ORGANIZED HEALTH CARE EDUCATION/TRAINING PROGRAM

## 2023-02-15 PROCEDURE — 11000001 HC ACUTE MED/SURG PRIVATE ROOM

## 2023-02-15 PROCEDURE — 36415 COLL VENOUS BLD VENIPUNCTURE: CPT | Performed by: STUDENT IN AN ORGANIZED HEALTH CARE EDUCATION/TRAINING PROGRAM

## 2023-02-15 RX ORDER — METRONIDAZOLE 500 MG/1
500 TABLET ORAL EVERY 8 HOURS
Status: DISCONTINUED | OUTPATIENT
Start: 2023-02-15 | End: 2023-02-16 | Stop reason: HOSPADM

## 2023-02-15 RX ORDER — ACETAMINOPHEN 325 MG/1
325 TABLET ORAL EVERY 6 HOURS PRN
Status: DISCONTINUED | OUTPATIENT
Start: 2023-02-15 | End: 2023-02-16 | Stop reason: HOSPADM

## 2023-02-15 RX ORDER — CIPROFLOXACIN 500 MG/1
500 TABLET ORAL EVERY 12 HOURS
Status: DISCONTINUED | OUTPATIENT
Start: 2023-02-15 | End: 2023-02-16 | Stop reason: HOSPADM

## 2023-02-15 RX ADMIN — OXYCODONE 10 MG: 5 TABLET ORAL at 08:02

## 2023-02-15 RX ADMIN — OXYCODONE 10 MG: 5 TABLET ORAL at 02:02

## 2023-02-15 RX ADMIN — CIPROFLOXACIN 500 MG: 500 TABLET, FILM COATED ORAL at 02:02

## 2023-02-15 RX ADMIN — ONDANSETRON 8 MG: 8 TABLET, ORALLY DISINTEGRATING ORAL at 09:02

## 2023-02-15 RX ADMIN — METRONIDAZOLE 500 MG: 500 TABLET ORAL at 10:02

## 2023-02-15 RX ADMIN — CIPROFLOXACIN 500 MG: 500 TABLET, FILM COATED ORAL at 08:02

## 2023-02-15 RX ADMIN — METRONIDAZOLE 500 MG: 500 TABLET ORAL at 02:02

## 2023-02-15 RX ADMIN — ACETAMINOPHEN 325 MG: 325 TABLET ORAL at 02:02

## 2023-02-15 RX ADMIN — AMOXICILLIN AND CLAVULANATE POTASSIUM 1 TABLET: 875; 125 TABLET, FILM COATED ORAL at 09:02

## 2023-02-15 NOTE — PLAN OF CARE
Problem: Adult Inpatient Plan of Care  Goal: Plan of Care Review  Outcome: Ongoing, Progressing     Problem: Pain Acute  Goal: Acceptable Pain Control and Functional Ability  Outcome: Ongoing, Progressing

## 2023-02-15 NOTE — NURSING
ID [id number]  Used language line to communicate with patient. Pt reported pain to generalized abdomen, right shoulder, and left lower back. Provided PRN pain medication. Will continue to monitor pt.

## 2023-02-15 NOTE — ASSESSMENT & PLAN NOTE
22 y.o. female with no significant past medical history who was admitted for acute appendicitis. S/p lap appy 2/12 - found to have perforated appendicitis intra-op.     - Continue regular diet  - transition to cipro/flagyl  - PRN antiemetics and pain medication   - Encourage ambulation   - IS

## 2023-02-15 NOTE — PLAN OF CARE
Problem: Adult Inpatient Plan of Care  Goal: Plan of Care Review  Flowsheets (Taken 2/15/2023 1327)  Plan of Care Reviewed With: patient  Goal: Absence of Hospital-Acquired Illness or Injury  Intervention: Identify and Manage Fall Risk  Flowsheets (Taken 2/15/2023 1328)  Safety Promotion/Fall Prevention:   assistive device/personal item within reach   Fall Risk reviewed with patient/family   high risk medications identified   nonskid shoes/socks when out of bed   medications reviewed   room near unit station   instructed to call staff for mobility   side rails raised x 2  Intervention: Prevent Skin Injury  Flowsheets (Taken 2/15/2023 1328)  Body Position:   position changed independently   weight shifting  Skin Protection:   adhesive use limited   tubing/devices free from skin contact  Intervention: Prevent and Manage VTE (Venous Thromboembolism) Risk  Flowsheets (Taken 2/15/2023 1328)  Activity Management:   Arm raise - L1   Ankle pumps - L1   Rolling - L1   Straight leg raise - L1  VTE Prevention/Management:   ambulation promoted   bleeding precations maintained   bleeding risk assessed  Range of Motion: active ROM (range of motion) encouraged  Intervention: Prevent Infection  Flowsheets (Taken 2/15/2023 1328)  Infection Prevention: hand hygiene promoted     Problem: Pain Acute  Goal: Acceptable Pain Control and Functional Ability  Intervention: Develop Pain Management Plan  Flowsheets (Taken 2/15/2023 1328)  Pain Management Interventions: relaxation techniques promoted  Intervention: Prevent or Manage Pain  Flowsheets (Taken 2/15/2023 1328)  Medication Review/Management:   medications reviewed   high-risk medications identified   Pt alert able to make needs known,larry meds well,IV abx remains in progress,no s/s adverse reaction noted,reposition self q 2hrs,pain controlled by prn pain medication,POC explained,remains free from falls and pressure injuries,safety maintained,continue monitoring.

## 2023-02-15 NOTE — PROGRESS NOTES
[facility]  General Surgery  Progress Note    Subjective:     History of Present Illness:  Patient is a 22 y.o. female with no significant past medical history who was admitted for acute appendicitis. Patient states that she began having LUQ pain and emesis at 3 am yesterday morning. She reports being in usual state of health prior to that. States her pain progressed and she was unable to keep anything down so she went to the ED. Pain relocated to her lower abdomen. Tachycardic on admission. WBC on labs 27 and CT with findings consistent with non-perforated acute appendicitis.       Post-Op Info:  Procedure(s) (LRB):  APPENDECTOMY, LAPAROSCOPIC (N/A)   3 Days Post-Op     Interval History:   No acute events overnight.   Her abdominal pain is mildly improved but still present. She also notes nausea after eating.     Medications:  Continuous Infusions:      Scheduled Meds:   ciprofloxacin HCl  500 mg Oral Q12H    metroNIDAZOLE  500 mg Oral Q8H     PRN Meds:acetaminophen, acetaminophen, LIDOcaine (PF) 10 mg/ml (1%), melatonin, ondansetron, oxyCODONE, oxyCODONE, sodium chloride 0.9%     Review of patient's allergies indicates:  No Known Allergies  Objective:     Vital Signs (Most Recent):  Temp: 98.6 °F (37 °C) (02/15/23 0754)  Pulse: 94 (02/15/23 0754)  Resp: 20 (02/15/23 0754)  BP: (!) 99/57 (02/15/23 0754)  SpO2: 97 % (02/15/23 0754)   Vital Signs (24h Range):  Temp:  [98.2 °F (36.8 °C)-99.7 °F (37.6 °C)] 98.6 °F (37 °C)  Pulse:  [87-99] 94  Resp:  [14-20] 20  SpO2:  [96 %-98 %] 97 %  BP: ()/(54-66) 99/57     Weight: 61 kg (134 lb 7.7 oz)  Body mass index is 25.41 kg/m².    Intake/Output - Last 3 Shifts         02/13 0700  02/14 0659 02/14 0700  02/15 0659 02/15 0700  02/16 0659    P.O. 840 840 120    IV Piggyback       Total Intake(mL/kg) 840 (13.8) 840 (13.8) 120 (2)    Urine (mL/kg/hr)       Blood       Total Output       Net +840 +840 +120           Urine Occurrence 5 x 1 x     Stool Occurrence 1  x 0 x             Physical Exam  Constitutional:       General: She is not in acute distress.  HENT:      Head: Normocephalic and atraumatic.   Eyes:      Extraocular Movements: Extraocular movements intact.      Conjunctiva/sclera: Conjunctivae normal.      Pupils: Pupils are equal, round, and reactive to light.   Cardiovascular:      Rate and Rhythm: Normal rate and regular rhythm.   Pulmonary:      Effort: Pulmonary effort is normal. No respiratory distress.   Abdominal:      General: There is no distension.      Palpations: Abdomen is soft.      Comments: Appropriate tenderness.   Incisions c/d/I.    Neurological:      General: No focal deficit present.      Mental Status: She is alert.       Significant Labs:  I have reviewed all pertinent lab results within the past 24 hours.  CBC:   Recent Labs   Lab 02/15/23  0413   WBC 10.47   RBC 3.37*   HGB 9.4*   HCT 29.7*      MCV 88   MCH 27.9   MCHC 31.6*       CMP:   Recent Labs   Lab 02/11/23  2056 02/12/23  0222 02/13/23  0626   *   < > 99   CALCIUM 9.6   < > 8.5*   ALBUMIN 4.6  --   --    PROT 9.3*  --   --       < > 139   K 3.9   < > 3.8   CO2 23   < > 22*      < > 109   BUN 14   < > 7   CREATININE 0.8   < > 0.7   ALKPHOS 102  --   --    ALT 10  --   --    AST 15  --   --    BILITOT 1.2*  --   --     < > = values in this interval not displayed.         Significant Diagnostics:  I have reviewed all pertinent imaging results/findings within the past 24 hours.    Assessment/Plan:     * Acute appendicitis  22 y.o. female with no significant past medical history who was admitted for acute appendicitis. S/p lap appy 2/12 - found to have perforated appendicitis intra-op.     - Continue regular diet  - transition to cipro/flagyl  - PRN antiemetics and pain medication   - Encourage ambulation   - IS        Niranjan Miner MD  General Surgery  [facility] - Med Surg

## 2023-02-15 NOTE — SUBJECTIVE & OBJECTIVE
Interval History:   No acute events overnight.   Her abdominal pain is mildly improved but still present. She also notes nausea after eating.     Medications:  Continuous Infusions:      Scheduled Meds:   ciprofloxacin HCl  500 mg Oral Q12H    metroNIDAZOLE  500 mg Oral Q8H     PRN Meds:acetaminophen, acetaminophen, LIDOcaine (PF) 10 mg/ml (1%), melatonin, ondansetron, oxyCODONE, oxyCODONE, sodium chloride 0.9%     Review of patient's allergies indicates:  No Known Allergies  Objective:     Vital Signs (Most Recent):  Temp: 98.6 °F (37 °C) (02/15/23 0754)  Pulse: 94 (02/15/23 0754)  Resp: 20 (02/15/23 0754)  BP: (!) 99/57 (02/15/23 0754)  SpO2: 97 % (02/15/23 0754)   Vital Signs (24h Range):  Temp:  [98.2 °F (36.8 °C)-99.7 °F (37.6 °C)] 98.6 °F (37 °C)  Pulse:  [87-99] 94  Resp:  [14-20] 20  SpO2:  [96 %-98 %] 97 %  BP: ()/(54-66) 99/57     Weight: 61 kg (134 lb 7.7 oz)  Body mass index is 25.41 kg/m².    Intake/Output - Last 3 Shifts         02/13 0700  02/14 0659 02/14 0700  02/15 0659 02/15 0700  02/16 0659    P.O. 840 840 120    IV Piggyback       Total Intake(mL/kg) 840 (13.8) 840 (13.8) 120 (2)    Urine (mL/kg/hr)       Blood       Total Output       Net +840 +840 +120           Urine Occurrence 5 x 1 x     Stool Occurrence 1 x 0 x             Physical Exam  Constitutional:       General: She is not in acute distress.  HENT:      Head: Normocephalic and atraumatic.   Eyes:      Extraocular Movements: Extraocular movements intact.      Conjunctiva/sclera: Conjunctivae normal.      Pupils: Pupils are equal, round, and reactive to light.   Cardiovascular:      Rate and Rhythm: Normal rate and regular rhythm.   Pulmonary:      Effort: Pulmonary effort is normal. No respiratory distress.   Abdominal:      General: There is no distension.      Palpations: Abdomen is soft.      Comments: Appropriate tenderness.   Incisions c/d/I.    Neurological:      General: No focal deficit present.      Mental Status: She  is alert.       Significant Labs:  I have reviewed all pertinent lab results within the past 24 hours.  CBC:   Recent Labs   Lab 02/15/23  0413   WBC 10.47   RBC 3.37*   HGB 9.4*   HCT 29.7*      MCV 88   MCH 27.9   MCHC 31.6*       CMP:   Recent Labs   Lab 02/11/23 2056 02/12/23  0222 02/13/23  0626   *   < > 99   CALCIUM 9.6   < > 8.5*   ALBUMIN 4.6  --   --    PROT 9.3*  --   --       < > 139   K 3.9   < > 3.8   CO2 23   < > 22*      < > 109   BUN 14   < > 7   CREATININE 0.8   < > 0.7   ALKPHOS 102  --   --    ALT 10  --   --    AST 15  --   --    BILITOT 1.2*  --   --     < > = values in this interval not displayed.         Significant Diagnostics:  I have reviewed all pertinent imaging results/findings within the past 24 hours.

## 2023-02-15 NOTE — NURSING
ID [id number]  Used language line to explain what the MD ordered for additional pain medication. Pt had no questions or concerns at the moment.

## 2023-02-16 VITALS
WEIGHT: 134.5 LBS | BODY MASS INDEX: 25.39 KG/M2 | SYSTOLIC BLOOD PRESSURE: 107 MMHG | TEMPERATURE: 99 F | DIASTOLIC BLOOD PRESSURE: 61 MMHG | HEART RATE: 86 BPM | RESPIRATION RATE: 16 BRPM | OXYGEN SATURATION: 99 % | HEIGHT: 61 IN

## 2023-02-16 PROCEDURE — 25000003 PHARM REV CODE 250: Performed by: STUDENT IN AN ORGANIZED HEALTH CARE EDUCATION/TRAINING PROGRAM

## 2023-02-16 RX ORDER — OXYCODONE HYDROCHLORIDE 5 MG/1
5 TABLET ORAL EVERY 4 HOURS PRN
Qty: 20 TABLET | Refills: 0 | Status: SHIPPED | OUTPATIENT
Start: 2023-02-16

## 2023-02-16 RX ORDER — METRONIDAZOLE 500 MG/1
500 TABLET ORAL EVERY 8 HOURS
Qty: 15 TABLET | Refills: 0 | Status: SHIPPED | OUTPATIENT
Start: 2023-02-16 | End: 2023-02-21

## 2023-02-16 RX ORDER — CIPROFLOXACIN 500 MG/1
500 TABLET ORAL EVERY 12 HOURS
Qty: 10 TABLET | Refills: 0 | Status: SHIPPED | OUTPATIENT
Start: 2023-02-16 | End: 2023-02-21

## 2023-02-16 RX ADMIN — METRONIDAZOLE 500 MG: 500 TABLET ORAL at 05:02

## 2023-02-16 RX ADMIN — OXYCODONE 10 MG: 5 TABLET ORAL at 05:02

## 2023-02-16 RX ADMIN — CIPROFLOXACIN 500 MG: 500 TABLET, FILM COATED ORAL at 09:02

## 2023-02-16 NOTE — NURSING
Pt ambulating around the [place]oria, reported pain in the lower abdomen, especially when urinating. Will continue to monitor pt.

## 2023-02-16 NOTE — NURSING
"Patient discharged per MD order.  IV removed.  Catheter tip intact.  No distress noted.  Discharge instructions explained.  AVS given to patient.  Patient verbalized understanding.  VSS.  Afebrile.  No complaints of pain, N/V, diarrhea or SOB.  Pt informed Rx will be brought up to her room by [facility] outpt pharmacy.pt states "my boyfriend will not be able to pick me up until 6pm".     "

## 2023-02-16 NOTE — DISCHARGE SUMMARY
[facility] Surg  DISCHARGE SUMMARY  General Surgery      Admit Date:  2/11/2023    Discharge Date and Time:  2/16/2023  07:49 AM    Attending Physician:  Flako Ruelas MD     Discharge Provider:  Talia Dumont MD     Reason for Admission:  Acute appendicitis     Procedures Performed:  Procedure(s) (LRB):  APPENDECTOMY, LAPAROSCOPIC (N/A)    Hospital Course:  Please see the preoperative H&P and other available documentation for full details related to history prior to this admission.  Briefly, Ese Martinez is a 22 y.o. female who was admitted following for Acute appendicitis    Following a complete preoperative discussion of the risks and benefits of surgery with signed informed consent, the patient was taken to the operating room on 2/12/2023 and underwent the above stated procedures.  The patient tolerated surgery well and there were no complications.  Please see the operative report for full intraoperative findings and details. Postoperatively, the patient was transferred from PACU to the floor in stable condition.  The patient's labs and vital signs remained stable and appropriate throughout their hospital stay. They tolerated advancement of their diet, pain was well controlled, bowel function had returned, patient was ambulatory and voiding appropriately. Patient transitioned to PO antibiotics. By 4 Days Post-Op  the patient was deemed okay for discharge and instructed to follow up in clinic in 2 weeks.        Consults:  None.    Significant Diagnostic Studies:   Recent Labs   Lab 02/15/23  0413   WBC 10.47   HGB 9.4*   HCT 29.7*      No results for input(s): NA, K, CL, CO2, BUN, CREATININE, GLU, CALCIUM, CAION, MG, PHOS, AST, ALT, ALKPHOS, BILITOT, BILIDIR, PROT, ALBUMIN, PREALBUMIN, AMYLASE, LIPASE, CRP, HSCRP, SEDRATE, PROCAL in the last 72 hours.No results for input(s): INR, PTT, LABHEPA, LACTATE, TROPONINI, CPK, CPKMB, MB, BNP in the last 72 hours.No results for input(s): PH,  PCO2, PO2, HCO3 in the last 72 hours.      Final Diagnoses:   Principal Problem:    Acute appendicitis   Secondary Diagnoses:    Active Hospital Problems    Diagnosis  POA    *Acute appendicitis [K35.80]  Unknown      Resolved Hospital Problems   No resolved problems to display.       Discharged Condition:  Good    Disposition:  Home or Self Care    Follow Up/Patient Instructions:     Medications:  Reconciled Home Medications:    Current Discharge Medication List        START taking these medications    Details   ciprofloxacin HCl (CIPRO) 500 MG tablet Take 1 tablet (500 mg total) by mouth every 12 (twelve) hours. for 5 days  Qty: 10 tablet, Refills: 0      metroNIDAZOLE (FLAGYL) 500 MG tablet Take 1 tablet (500 mg total) by mouth every 8 (eight) hours. for 5 days  Qty: 15 tablet, Refills: 0      oxyCODONE (ROXICODONE) 5 MG immediate release tablet Take 1 tablet (5 mg total) by mouth every 4 (four) hours as needed.  Qty: 20 tablet, Refills: 0    Comments: Quantity prescribed more than 7 day supply? No           Discharge Procedure Orders   Diet Adult Regular     Other restrictions (specify):   Order Comments: You had a laparoscopic appendectomy performed.     Please no heavy lifting/pushing/pulling. Do not lift anything heavier than a gallon of milk.   Please no driving while on narcotic pain medication.     You have been prescribed a narcotic pain medication to help with post-operative pain. Please wean over the next few days. You may alternate tylenol and ibuprofen instead.   Please make sure to take 1 capful of Miralax per day to help with narcotic associated constipation.     Skin glue will fall off on its own.   Showers are okay. Please no baths or soaking.     You have no diet restrictions.    Please follow up in clinic with Dr. Ruelas in 2 weeks.     Notify your health care provider if you experience any of the following:  increased confusion or weakness     Notify your health care provider if you experience  any of the following:  persistent dizziness, light-headedness, or visual disturbances     Notify your health care provider if you experience any of the following:  worsening rash     Notify your health care provider if you experience any of the following:  severe persistent headache     Notify your health care provider if you experience any of the following:  difficulty breathing or increased cough     Notify your health care provider if you experience any of the following:  redness, tenderness, or signs of infection (pain, swelling, redness, odor or green/yellow discharge around incision site)     Notify your health care provider if you experience any of the following:  severe uncontrolled pain     Notify your health care provider if you experience any of the following:  persistent nausea and vomiting or diarrhea     Notify your health care provider if you experience any of the following:  temperature >100.4     No dressing needed      Follow-up Information       Flako Ruelas MD Follow up in 2 week(s).    Specialties: General Surgery, Surgery  Why: For wound re-check  Contact information:  [address]  [address]  [facility] [id number] [phone]                             Talia Dumont MD

## 2023-02-16 NOTE — PLAN OF CARE
[facility] - Med Surg  Discharge Final Note  TN sent a secure chat to med surg nurse Lesvia that the patient is cleared for discharge from case management's viewpoint.      Primary Care Provider: [facility]    Expected Discharge Date: 2/16/2023    Final Discharge Note (most recent)       Final Note - 02/16/23 0804          Final Note    Assessment Type Final Discharge Note     Anticipated Discharge Disposition Home or Self Care     What phone number can be called within the next 1-3 days to see how you are doing after discharge? --   see chart    Hospital Resources/Appts/Education Provided Appointments scheduled and added to AVS;Appointments scheduled by Navigator/Coordinator        Post-Acute Status    Discharge Delays None known at this time                     Important Message from Medicare na             Contact Info       Flako Ruelas MD   Specialty: General Surgery, Surgery    [address]  [address]  [facility] [place] [address]   Phone: [phone]       Next Steps: Follow up on 3/3/2023    Instructions: For wound re-check  10:00AM    [facility] [place]    [address] [facility] [address]   Phone: [phone]       Next Steps: Schedule an appointment as soon as possible for a visit in 1 week(s)    Instructions: out patient services

## 2023-02-16 NOTE — NURSING
"  ID [id number] spent 8 minutes on language line    Pt report a lot pain in abdomen, left flank and right shoulder. And that she is urinating a lot but is "forcing" to urinate.   "

## 2023-02-16 NOTE — SUBJECTIVE & OBJECTIVE
Interval History:   No acute events overnight.   Still with some lower abdominal pain but improved with medication.   Otherwise feeling well.   Tolerating diet.    Medications:  Continuous Infusions:      Scheduled Meds:   ciprofloxacin HCl  500 mg Oral Q12H    metroNIDAZOLE  500 mg Oral Q8H     PRN Meds:acetaminophen, acetaminophen, LIDOcaine (PF) 10 mg/ml (1%), melatonin, ondansetron, oxyCODONE, oxyCODONE, sodium chloride 0.9%     Review of patient's allergies indicates:  No Known Allergies  Objective:     Vital Signs (Most Recent):  Temp: 98.5 °F (36.9 °C) (02/16/23 0702)  Pulse: 88 (02/16/23 0702)  Resp: 17 (02/16/23 0702)  BP: (!) 100/58 (02/16/23 0702)  SpO2: 97 % (02/16/23 0702)   Vital Signs (24h Range):  Temp:  [98.3 °F (36.8 °C)-99.2 °F (37.3 °C)] 98.5 °F (36.9 °C)  Pulse:  [] 88  Resp:  [16-20] 17  SpO2:  [95 %-100 %] 97 %  BP: ()/(56-62) 100/58     Weight: 61 kg (134 lb 7.7 oz)  Body mass index is 25.41 kg/m².    Intake/Output - Last 3 Shifts         02/14 0700  02/15 0659 02/15 0700  02/16 0659 02/16 0700  02/17 0659    P.O. 840 480     Total Intake(mL/kg) 840 (13.8) 480 (7.9)     Net +840 +480            Urine Occurrence 1 x 5 x     Stool Occurrence 0 x 0 x             Physical Exam  Constitutional:       General: She is not in acute distress.  HENT:      Head: Normocephalic and atraumatic.   Eyes:      Extraocular Movements: Extraocular movements intact.      Conjunctiva/sclera: Conjunctivae normal.      Pupils: Pupils are equal, round, and reactive to light.   Cardiovascular:      Rate and Rhythm: Normal rate and regular rhythm.   Pulmonary:      Effort: Pulmonary effort is normal. No respiratory distress.   Abdominal:      General: There is no distension.      Palpations: Abdomen is soft.      Comments: Appropriate tenderness.   Incisions c/d/I.    Neurological:      General: No focal deficit present.      Mental Status: She is alert.       Significant Labs:  I have reviewed all  pertinent lab results within the past 24 hours.  CBC:   Recent Labs   Lab 02/15/23  0413   WBC 10.47   RBC 3.37*   HGB 9.4*   HCT 29.7*      MCV 88   MCH 27.9   MCHC 31.6*       CMP:   Recent Labs   Lab 02/11/23 2056 02/12/23 0222 02/13/23  0626   *   < > 99   CALCIUM 9.6   < > 8.5*   ALBUMIN 4.6  --   --    PROT 9.3*  --   --       < > 139   K 3.9   < > 3.8   CO2 23   < > 22*      < > 109   BUN 14   < > 7   CREATININE 0.8   < > 0.7   ALKPHOS 102  --   --    ALT 10  --   --    AST 15  --   --    BILITOT 1.2*  --   --     < > = values in this interval not displayed.         Significant Diagnostics:  I have reviewed all pertinent imaging results/findings within the past 24 hours.

## 2023-02-16 NOTE — PLAN OF CARE
Pt free from falls, injury or any further trauma throughout shift. Pt AAO x4, ambulates independently in room. 3 surgical incisions to abdomen with dermabond; noted some slight redness to midlower incision site. Continued medications as ordered. Pain controlled with PRN medication.  Bed at lowest position call light in reach, instruct pt to call for assistance.     Problem: Adult Inpatient Plan of Care  Goal: Plan of Care Review  Outcome: Ongoing, Progressing     Problem: Pain Acute  Goal: Acceptable Pain Control and Functional Ability  Outcome: Ongoing, Progressing

## 2023-02-16 NOTE — PROGRESS NOTES
[facility] S[facility]  General Surgery  Progress Note    Subjective:     History of Present Illness:  Patient is a 22 y.o. female with no significant past medical history who was admitted for acute appendicitis. Patient states that she began having LUQ pain and emesis at 3 am yesterday morning. She reports being in usual state of health prior to that. States her pain progressed and she was unable to keep anything down so she went to the ED. Pain relocated to her lower abdomen. Tachycardic on admission. WBC on labs 27 and CT with findings consistent with non-perforated acute appendicitis.       Post-Op Info:  Procedure(s) (LRB):  APPENDECTOMY, LAPAROSCOPIC (N/A)   4 Days Post-Op     Interval History:   No acute events overnight.   Still with some lower abdominal pain but improved with medication.   Otherwise feeling well.   Tolerating diet.    Medications:  Continuous Infusions:      Scheduled Meds:   ciprofloxacin HCl  500 mg Oral Q12H    metroNIDAZOLE  500 mg Oral Q8H     PRN Meds:acetaminophen, acetaminophen, LIDOcaine (PF) 10 mg/ml (1%), melatonin, ondansetron, oxyCODONE, oxyCODONE, sodium chloride 0.9%     Review of patient's allergies indicates:  No Known Allergies  Objective:     Vital Signs (Most Recent):  Temp: 98.5 °F (36.9 °C) (02/16/23 0702)  Pulse: 88 (02/16/23 0702)  Resp: 17 (02/16/23 0702)  BP: (!) 100/58 (02/16/23 0702)  SpO2: 97 % (02/16/23 0702)   Vital Signs (24h Range):  Temp:  [98.3 °F (36.8 °C)-99.2 °F (37.3 °C)] 98.5 °F (36.9 °C)  Pulse:  [] 88  Resp:  [16-20] 17  SpO2:  [95 %-100 %] 97 %  BP: ()/(56-62) 100/58     Weight: 61 kg (134 lb 7.7 oz)  Body mass index is 25.41 kg/m².    Intake/Output - Last 3 Shifts         02/14 0700  02/15 0659 02/15 0700 02/16 0659 02/16 0700 02/17 0659    P.O. 840 480     Total Intake(mL/kg) 840 (13.8) 480 (7.9)     Net +840 +480            Urine Occurrence 1 x 5 x     Stool Occurrence 0 x 0 x             Physical Exam  Constitutional:        General: She is not in acute distress.  HENT:      Head: Normocephalic and atraumatic.   Eyes:      Extraocular Movements: Extraocular movements intact.      Conjunctiva/sclera: Conjunctivae normal.      Pupils: Pupils are equal, round, and reactive to light.   Cardiovascular:      Rate and Rhythm: Normal rate and regular rhythm.   Pulmonary:      Effort: Pulmonary effort is normal. No respiratory distress.   Abdominal:      General: There is no distension.      Palpations: Abdomen is soft.      Comments: Appropriate tenderness.   Incisions c/d/I.    Neurological:      General: No focal deficit present.      Mental Status: She is alert.       Significant Labs:  I have reviewed all pertinent lab results within the past 24 hours.  CBC:   Recent Labs   Lab 02/15/23  0413   WBC 10.47   RBC 3.37*   HGB 9.4*   HCT 29.7*      MCV 88   MCH 27.9   MCHC 31.6*       CMP:   Recent Labs   Lab 02/11/23  2056 02/12/23  0222 02/13/23  0626   *   < > 99   CALCIUM 9.6   < > 8.5*   ALBUMIN 4.6  --   --    PROT 9.3*  --   --       < > 139   K 3.9   < > 3.8   CO2 23   < > 22*      < > 109   BUN 14   < > 7   CREATININE 0.8   < > 0.7   ALKPHOS 102  --   --    ALT 10  --   --    AST 15  --   --    BILITOT 1.2*  --   --     < > = values in this interval not displayed.         Significant Diagnostics:  I have reviewed all pertinent imaging results/findings within the past 24 hours.    Assessment/Plan:     * Acute appendicitis  22 y.o. female with no significant past medical history who was admitted for acute appendicitis. S/p lap appy 2/12 - found to have perforated appendicitis intra-op.     - Continue regular diet  - PO cipro/flagyl  - PRN antiemetics and pain medication   - Encourage ambulation   - IS    Dispo: plan for discharge this afternoon - states does not have a ride until around 6pm        Talia Dumont MD  General Surgery  [facility] - Med Surg

## 2023-02-16 NOTE — ASSESSMENT & PLAN NOTE
22 y.o. female with no significant past medical history who was admitted for acute appendicitis. S/p lap appy 2/12 - found to have perforated appendicitis intra-op.     - Continue regular diet  - PO cipro/flagyl  - PRN antiemetics and pain medication   - Encourage ambulation   - IS    Dispo: plan for discharge this afternoon - states does not have a ride until around 6pm

## 2023-02-16 NOTE — DISCHARGE INSTRUCTIONS
Por favor, no conduzca mientras talya tomando analgesicos narcoticos.    Le petersen recetado un analgesico narcotico para ayudar con el dolor posoperatorio.   Por favor, destete alka los proximos winkler. En [place] lugar, puede alternar Tylenol e ibuprofeno.   Asegurese de alex barbara tapa de Miralax por santiago para ayudar con el estrenimiento asociado con carcoticos.     Por favor, denys un seguimiento con Dr. Ruelas en clinica en dos semanas.

## 2023-02-16 NOTE — NURSING
" ID [id number] and [id number]. Spent total 16 minutes on language line .     Pt reporting left lower back pain and swelling. Abdomen pain 7/10  Back pain 6/10   "straining to go to the bathroom and have a pulling sensation to the abdomen when I urinate"      "

## 2023-02-23 NOTE — PHYSICIAN QUERY
PT Name: Ese Martinez  MR #: [id number]     DOCUMENTATION CLARIFICATION      CDS/: Soheila Rodriguez RN, CDS               Contact information: [email or web address]    This form is a permanent document in the medical record.     Query Date: February 23, 2023    Dear Provider,  By submitting this query, we are merely seeking further clarification of documentation.  Please utilize your independent clinical judgment when addressing the question(s) below.     The Medical Record contains the following:    Supporting Clinical Findings Location in Medical Record   22 y.o. female with no significant past medical history who was admitted for acute appendicitis.    Tachycardic on admission. WBC on labs 27 and CT with findings consistent with non-perforated acute appendicitis    Comments: Lower abdomen tender to palpation  No rebound. Voluntary guarding.      Description of Procedure: ... We found a necrotic appendix. There was some turbid fluid, suctioned out. We used the harmonic to take down the mesoappendix. We were being very gentle with the appendix due to a friable appearance... We removed the appendix with an endocatch bag. There was spillage of pus and contents of the appendix. This was washed out thoroughly. The base of the cecum looked good, no inflammation    POD 1 from lap appy. Appendix found to be perforated    Final Pathologic Diagnosis   Appendix, appendectomy:   Acute suppurative appendicitis, periappendicitis, and acute serositis    Gross:  ... The serosa shows multifocal area of exudates.  The distal tip area shows a 0.4 x 0.4 cm perforation which is inked blue.  The proximal margin is inked black.  Serial sectioning demonstrates abscess and hemorrhage in appendiceal lumen and a transmural perforation in the distal tip       H&P 2/12       H&P 2/12      H&P 2/12       Op Note 2/12                 General Surgery PN 2/13    Path Report   Collected 2/12      Path Report   Collected 2/12      Please clarify if the __perforation (of appendix)__ diagnosis has been:    [ x ] Ruled In   [  ] Ruled Out   [  ] Still to be ruled out at the time of discharge   [  ] Other/Clarification of findings (please specify): _______________    [   ] Clinically undetermined           Please document in your progress notes daily for the duration of treatment, until resolved, and include in your discharge summary.    Form No. [id number]

## 2023-03-03 ENCOUNTER — OFFICE VISIT (OUTPATIENT)
Dept: SURGERY | Facility: CLINIC | Age: 23
End: 2023-03-03

## 2023-03-03 VITALS
WEIGHT: 128 LBS | HEART RATE: 104 BPM | SYSTOLIC BLOOD PRESSURE: 114 MMHG | OXYGEN SATURATION: 98 % | BODY MASS INDEX: 24.17 KG/M2 | DIASTOLIC BLOOD PRESSURE: 78 MMHG | HEIGHT: 61 IN

## 2023-03-03 DIAGNOSIS — K35.80 ACUTE APPENDICITIS, UNSPECIFIED ACUTE APPENDICITIS TYPE: Primary | ICD-10-CM

## 2023-03-03 PROCEDURE — 99999 PR PBB SHADOW E&M-EST. PATIENT-LVL III: ICD-10-PCS | Mod: PBBFAC,,, | Performed by: SURGERY

## 2023-03-03 PROCEDURE — 99024 POSTOP FOLLOW-UP VISIT: CPT | Mod: ,,, | Performed by: SURGERY

## 2023-03-03 PROCEDURE — 99213 OFFICE O/P EST LOW 20 MIN: CPT | Mod: PBBFAC | Performed by: SURGERY

## 2023-03-03 PROCEDURE — 99999 PR PBB SHADOW E&M-EST. PATIENT-LVL III: CPT | Mod: PBBFAC,,, | Performed by: SURGERY

## 2023-03-03 PROCEDURE — 99024 PR POST-OP FOLLOW-UP VISIT: ICD-10-PCS | Mod: ,,, | Performed by: SURGERY

## 2023-03-03 NOTE — PROGRESS NOTES
Surgery Clinic Note    Ese Martinez is a 22 y.o. year old female in clinic today for follow up of lap appy. Doing ok, still umbilical pain but improving.  No f/c/n/v/sob/cp    ROS:  Negative except above    PE:  Vitals:    03/03/23 0954   BP: 114/78   Pulse: 104       NAD  No belabored breathing  Abd soft nt nd  Incisions c/d/I    A/P:  Ese Martinez is a 22 y.o. year old female s/p lap appy    -rtc prn    Flako Ruelas  General Surgery - [facility]  3/3/2023

## 2023-04-27 ENCOUNTER — PATIENT MESSAGE (OUTPATIENT)
Dept: SURGERY | Facility: HOSPITAL | Age: 23
End: 2023-04-27

## (undated) DEVICE — GOWN NONREINF SET-IN SLV XL

## (undated) DEVICE — SYR 10CC LUER LOCK

## (undated) DEVICE — TROCAR ENDOPATH XCEL 5X100MM

## (undated) DEVICE — APPLICATOR CHLORAPREP ORN 26ML

## (undated) DEVICE — Device

## (undated) DEVICE — ELECTRODE REM PLYHSV RETURN 9

## (undated) DEVICE — BAG TISS RETRV MONARCH 10MM

## (undated) DEVICE — SUT ETHIBOND 0 CR/CT-2 8-18

## (undated) DEVICE — SUT VCRL ENDOLOOP 0 18 VIOL

## (undated) DEVICE — TROCAR KII BLLN 12MM 10CM

## (undated) DEVICE — TUBING INSUFFLATION 10

## (undated) DEVICE — STAPLER ECHELON FLEX GST 60MM

## (undated) DEVICE — TRAY FOLEY 16FR INFECTION CONT

## (undated) DEVICE — PACK ENDOSCOPY GENERAL

## (undated) DEVICE — COVER OVERHEAD SURG LT BLUE

## (undated) DEVICE — NDL HYPO REG 25G X 1 1/2

## (undated) DEVICE — BLANKET UPPER BODY 78.7X29.9IN

## (undated) DEVICE — GLOVE SURGICAL LATEX SZ 7

## (undated) DEVICE — UNDERGLOVES BIOGEL PI SIZE 7.5

## (undated) DEVICE — ADHESIVE DERMABOND ADVANCED

## (undated) DEVICE — SUT MONOCRYL 4-0 PS-2

## (undated) DEVICE — IRRIGATOR ENDOSCOPY DISP.

## (undated) DEVICE — KIT ANTIFOG

## (undated) DEVICE — ADHESIVE DERMABOND MINI HV

## (undated) DEVICE — RELOAD ECHELON FLEX BLU 60MM